# Patient Record
Sex: FEMALE | Race: WHITE | Employment: OTHER | URBAN - METROPOLITAN AREA
[De-identification: names, ages, dates, MRNs, and addresses within clinical notes are randomized per-mention and may not be internally consistent; named-entity substitution may affect disease eponyms.]

---

## 2017-02-25 RX ORDER — EZETIMIBE 10 MG/1
10 TABLET ORAL DAILY
Qty: 90 TABLET | Refills: 3 | Status: SHIPPED | OUTPATIENT
Start: 2017-02-25 | End: 2017-05-01 | Stop reason: SDUPTHER

## 2017-05-01 ENCOUNTER — OFFICE VISIT (OUTPATIENT)
Dept: FAMILY MEDICINE CLINIC | Age: 77
End: 2017-05-01

## 2017-05-01 VITALS
DIASTOLIC BLOOD PRESSURE: 78 MMHG | SYSTOLIC BLOOD PRESSURE: 134 MMHG | BODY MASS INDEX: 30.21 KG/M2 | WEIGHT: 176 LBS | HEART RATE: 71 BPM | OXYGEN SATURATION: 98 %

## 2017-05-01 DIAGNOSIS — M79.2 PERIPHERAL NEUROPATHIC PAIN: ICD-10-CM

## 2017-05-01 DIAGNOSIS — M77.8 CAPSULITIS OF RIGHT SHOULDER: ICD-10-CM

## 2017-05-01 DIAGNOSIS — I83.893 VARICOSE VEINS OF LOWER EXTREMITIES WITH OTHER COMPLICATIONS: ICD-10-CM

## 2017-05-01 DIAGNOSIS — I10 ESSENTIAL HYPERTENSION: Primary | ICD-10-CM

## 2017-05-01 PROCEDURE — G8399 PT W/DXA RESULTS DOCUMENT: HCPCS | Performed by: FAMILY MEDICINE

## 2017-05-01 PROCEDURE — 1036F TOBACCO NON-USER: CPT | Performed by: FAMILY MEDICINE

## 2017-05-01 PROCEDURE — 4040F PNEUMOC VAC/ADMIN/RCVD: CPT | Performed by: FAMILY MEDICINE

## 2017-05-01 PROCEDURE — 1090F PRES/ABSN URINE INCON ASSESS: CPT | Performed by: FAMILY MEDICINE

## 2017-05-01 PROCEDURE — 1123F ACP DISCUSS/DSCN MKR DOCD: CPT | Performed by: FAMILY MEDICINE

## 2017-05-01 PROCEDURE — 99214 OFFICE O/P EST MOD 30 MIN: CPT | Performed by: FAMILY MEDICINE

## 2017-05-01 PROCEDURE — 3017F COLORECTAL CA SCREEN DOC REV: CPT | Performed by: FAMILY MEDICINE

## 2017-05-01 PROCEDURE — G8427 DOCREV CUR MEDS BY ELIG CLIN: HCPCS | Performed by: FAMILY MEDICINE

## 2017-05-01 PROCEDURE — G8419 CALC BMI OUT NRM PARAM NOF/U: HCPCS | Performed by: FAMILY MEDICINE

## 2017-05-01 RX ORDER — GABAPENTIN 100 MG/1
CAPSULE ORAL
Qty: 90 CAPSULE | Refills: 3 | Status: SHIPPED | OUTPATIENT
Start: 2017-05-01 | End: 2017-06-26 | Stop reason: SDUPTHER

## 2017-05-01 RX ORDER — TRIAMTERENE AND HYDROCHLOROTHIAZIDE 37.5; 25 MG/1; MG/1
1 TABLET ORAL DAILY
Qty: 90 TABLET | Refills: 3 | Status: SHIPPED | OUTPATIENT
Start: 2017-05-01 | End: 2018-06-24 | Stop reason: SDUPTHER

## 2017-05-01 RX ORDER — PRAMIPEXOLE DIHYDROCHLORIDE 0.5 MG/1
TABLET ORAL
Qty: 180 TABLET | Refills: 3 | Status: SHIPPED | OUTPATIENT
Start: 2017-05-01 | End: 2018-07-05 | Stop reason: SDUPTHER

## 2017-05-01 RX ORDER — EPINEPHRINE 0.3 MG/.3ML
INJECTION SUBCUTANEOUS
Qty: 1 EACH | Refills: 0 | Status: SHIPPED | OUTPATIENT
Start: 2017-05-01 | End: 2018-11-14 | Stop reason: SDUPTHER

## 2017-05-01 RX ORDER — EZETIMIBE 10 MG/1
10 TABLET ORAL DAILY
Qty: 90 TABLET | Refills: 3 | Status: SHIPPED | OUTPATIENT
Start: 2017-05-01 | End: 2018-06-24 | Stop reason: SDUPTHER

## 2017-05-01 ASSESSMENT — ENCOUNTER SYMPTOMS
SHORTNESS OF BREATH: 0
DIARRHEA: 0
BACK PAIN: 0
CONSTIPATION: 0
ABDOMINAL PAIN: 0

## 2017-05-02 LAB
ALBUMIN SERPL-MCNC: 4.1 G/DL (ref 3.5–5)
ALP BLD-CCNC: 78 IU/L (ref 35–104)
ALT SERPL-CCNC: 17 IU/L (ref 10–35)
AST SERPL-CCNC: 19 IU/L (ref 10–35)
BASOPHILS ABSOLUTE: 0 THOU/MCL (ref 0.01–0.07)
BASOPHILS ABSOLUTE: 1 %
BILIRUB SERPL-MCNC: 0.4 MG/DL (ref 0–1)
BUN BLDV-MCNC: 17 MG/DL (ref 8–26)
CALCIUM SERPL-MCNC: 10.2 MG/DL (ref 8.8–10.1)
CHLORIDE BLD-SCNC: 100 MEQ/L (ref 101–111)
CHOLESTEROL, TOTAL: 186 MG/DL
CHOLESTEROL/HDL RATIO: 2.7 (ref 1.9–4.2)
CO2: 25 MEQ/L (ref 22–29)
CREAT SERPL-MCNC: 0.71 MG/DL (ref 0.44–1.03)
EOSINOPHILS ABSOLUTE: 0.1 THOU/MCL (ref 0.03–0.45)
EOSINOPHILS RELATIVE PERCENT: 2 %
ESTIMATED AVERAGE GLUCOSE: 123 MG/DL
FOLATE: >20 NG/ML
GFR AFRICAN AMERICAN: >60 ML/MIN/1.73 SQ METER
GFR NON-AFRICAN AMERICAN: >60 ML/MIN/1.73 SQ METER
GLUCOSE BLD-MCNC: 102 MG/DL (ref 70–99)
HBA1C MFR BLD: 5.9 % (ref 4–6)
HCT VFR BLD CALC: 35 % (ref 36–46)
HDLC SERPL-MCNC: 69 MG/DL
HEMOGLOBIN: 11.6 G/DL (ref 12–15.2)
LDL CHOLESTEROL CALCULATED: 98 MG/DL
LDL/HDL RATIO: 1.4 (ref 1–4)
LYMPHOCYTES ABSOLUTE: 1 THOU/MCL (ref 1–4)
LYMPHOCYTES RELATIVE PERCENT: 15 %
MCH RBC QN AUTO: 29 PG (ref 27–33)
MCHC RBC AUTO-ENTMCNC: 33 G/DL (ref 32–36)
MCV RBC AUTO: 86 FL (ref 82–97)
MONOCYTES # BLD: 9 %
MONOCYTES ABSOLUTE: 0.6 THOU/MCL (ref 0.2–0.9)
NEUTROPHILS ABSOLUTE: 5 THOU/MCL (ref 1.8–7.7)
PDW BLD-RTO: 13.1 %
PLATELET # BLD: 247 THOU/MCL (ref 140–375)
POTASSIUM SERPL-SCNC: 3.9 MEQ/L (ref 3.6–5.1)
RBC # BLD: 4.05 MIL/MCL (ref 3.8–5.2)
SEG NEUTROPHILS: 73 %
SODIUM BLD-SCNC: 137 MEQ/L (ref 135–145)
T4 FREE: 1.3 NG/DL (ref 0.93–1.7)
TOTAL PROTEIN: 7 G/DL (ref 6.6–8.7)
TRIGL SERPL-MCNC: 94 MG/DL
TSH ULTRASENSITIVE: 2.58 MIU/L (ref 0.27–4.2)
VITAMIN B-12: 782 PG/ML (ref 211–946)
WBC # BLD: 6.8 THOU/MCL (ref 3.6–10.5)

## 2017-05-03 LAB — TREPONEMA PALLIDUM ANTIBODIES: 0.03 INDEX

## 2017-05-10 ENCOUNTER — TELEPHONE (OUTPATIENT)
Dept: FAMILY MEDICINE CLINIC | Age: 77
End: 2017-05-10

## 2017-06-26 ENCOUNTER — OFFICE VISIT (OUTPATIENT)
Dept: FAMILY MEDICINE CLINIC | Age: 77
End: 2017-06-26

## 2017-06-26 VITALS
WEIGHT: 172 LBS | DIASTOLIC BLOOD PRESSURE: 74 MMHG | SYSTOLIC BLOOD PRESSURE: 124 MMHG | BODY MASS INDEX: 29.52 KG/M2 | HEART RATE: 85 BPM | OXYGEN SATURATION: 97 %

## 2017-06-26 DIAGNOSIS — M79.2 PERIPHERAL NEUROPATHIC PAIN: Primary | ICD-10-CM

## 2017-06-26 PROCEDURE — 1036F TOBACCO NON-USER: CPT | Performed by: FAMILY MEDICINE

## 2017-06-26 PROCEDURE — 1090F PRES/ABSN URINE INCON ASSESS: CPT | Performed by: FAMILY MEDICINE

## 2017-06-26 PROCEDURE — G8427 DOCREV CUR MEDS BY ELIG CLIN: HCPCS | Performed by: FAMILY MEDICINE

## 2017-06-26 PROCEDURE — 1123F ACP DISCUSS/DSCN MKR DOCD: CPT | Performed by: FAMILY MEDICINE

## 2017-06-26 PROCEDURE — 4040F PNEUMOC VAC/ADMIN/RCVD: CPT | Performed by: FAMILY MEDICINE

## 2017-06-26 PROCEDURE — G8399 PT W/DXA RESULTS DOCUMENT: HCPCS | Performed by: FAMILY MEDICINE

## 2017-06-26 PROCEDURE — G8419 CALC BMI OUT NRM PARAM NOF/U: HCPCS | Performed by: FAMILY MEDICINE

## 2017-06-26 PROCEDURE — 99213 OFFICE O/P EST LOW 20 MIN: CPT | Performed by: FAMILY MEDICINE

## 2017-06-26 RX ORDER — GABAPENTIN 300 MG/1
300 CAPSULE ORAL 3 TIMES DAILY
Qty: 90 CAPSULE | Refills: 3 | Status: SHIPPED | OUTPATIENT
Start: 2017-06-26 | End: 2017-09-26 | Stop reason: SDUPTHER

## 2017-06-29 ENCOUNTER — PROCEDURE VISIT (OUTPATIENT)
Dept: NEUROLOGY | Age: 77
End: 2017-06-29

## 2017-06-29 DIAGNOSIS — G60.9 IDIOPATHIC PERIPHERAL NEUROPATHY: Primary | ICD-10-CM

## 2017-06-29 PROCEDURE — 95910 NRV CNDJ TEST 7-8 STUDIES: CPT | Performed by: PSYCHIATRY & NEUROLOGY

## 2017-06-29 PROCEDURE — 95886 MUSC TEST DONE W/N TEST COMP: CPT | Performed by: PSYCHIATRY & NEUROLOGY

## 2017-07-13 ENCOUNTER — OFFICE VISIT (OUTPATIENT)
Dept: FAMILY MEDICINE CLINIC | Age: 77
End: 2017-07-13

## 2017-07-13 VITALS
WEIGHT: 172.6 LBS | DIASTOLIC BLOOD PRESSURE: 74 MMHG | BODY MASS INDEX: 29.63 KG/M2 | HEART RATE: 75 BPM | SYSTOLIC BLOOD PRESSURE: 126 MMHG | TEMPERATURE: 98 F | OXYGEN SATURATION: 98 %

## 2017-07-13 DIAGNOSIS — J40 BRONCHITIS: Primary | ICD-10-CM

## 2017-07-13 PROCEDURE — G8399 PT W/DXA RESULTS DOCUMENT: HCPCS | Performed by: PHYSICIAN ASSISTANT

## 2017-07-13 PROCEDURE — 1090F PRES/ABSN URINE INCON ASSESS: CPT | Performed by: PHYSICIAN ASSISTANT

## 2017-07-13 PROCEDURE — 1123F ACP DISCUSS/DSCN MKR DOCD: CPT | Performed by: PHYSICIAN ASSISTANT

## 2017-07-13 PROCEDURE — G8427 DOCREV CUR MEDS BY ELIG CLIN: HCPCS | Performed by: PHYSICIAN ASSISTANT

## 2017-07-13 PROCEDURE — 4040F PNEUMOC VAC/ADMIN/RCVD: CPT | Performed by: PHYSICIAN ASSISTANT

## 2017-07-13 PROCEDURE — 99213 OFFICE O/P EST LOW 20 MIN: CPT | Performed by: PHYSICIAN ASSISTANT

## 2017-07-13 PROCEDURE — G8419 CALC BMI OUT NRM PARAM NOF/U: HCPCS | Performed by: PHYSICIAN ASSISTANT

## 2017-07-13 PROCEDURE — 1036F TOBACCO NON-USER: CPT | Performed by: PHYSICIAN ASSISTANT

## 2017-07-13 RX ORDER — GUAIFENESIN AND CODEINE PHOSPHATE 100; 10 MG/5ML; MG/5ML
5 SOLUTION ORAL EVERY 4 HOURS PRN
Qty: 240 ML | Refills: 0 | Status: SHIPPED | OUTPATIENT
Start: 2017-07-13 | End: 2017-07-20

## 2017-07-13 RX ORDER — LEVOFLOXACIN 500 MG/1
500 TABLET, FILM COATED ORAL DAILY
Qty: 10 TABLET | Refills: 0 | Status: SHIPPED | OUTPATIENT
Start: 2017-07-13 | End: 2017-07-23

## 2017-07-13 ASSESSMENT — ENCOUNTER SYMPTOMS
COUGH: 1
NAUSEA: 0
DIARRHEA: 0
SHORTNESS OF BREATH: 0
VOMITING: 0
SINUS PRESSURE: 0
SORE THROAT: 1
WHEEZING: 0

## 2017-09-26 ENCOUNTER — OFFICE VISIT (OUTPATIENT)
Dept: FAMILY MEDICINE CLINIC | Age: 77
End: 2017-09-26

## 2017-09-26 VITALS
HEART RATE: 83 BPM | OXYGEN SATURATION: 97 % | HEIGHT: 64 IN | WEIGHT: 174 LBS | DIASTOLIC BLOOD PRESSURE: 68 MMHG | BODY MASS INDEX: 29.71 KG/M2 | SYSTOLIC BLOOD PRESSURE: 124 MMHG

## 2017-09-26 DIAGNOSIS — R91.8 MULTIPLE LUNG NODULES ON CT: ICD-10-CM

## 2017-09-26 DIAGNOSIS — R05.9 COUGH: ICD-10-CM

## 2017-09-26 DIAGNOSIS — M79.2 PERIPHERAL NEUROPATHIC PAIN: Primary | ICD-10-CM

## 2017-09-26 DIAGNOSIS — I10 ESSENTIAL HYPERTENSION: ICD-10-CM

## 2017-09-26 DIAGNOSIS — E78.5 OTHER AND UNSPECIFIED HYPERLIPIDEMIA: ICD-10-CM

## 2017-09-26 PROCEDURE — 1123F ACP DISCUSS/DSCN MKR DOCD: CPT | Performed by: FAMILY MEDICINE

## 2017-09-26 PROCEDURE — 4040F PNEUMOC VAC/ADMIN/RCVD: CPT | Performed by: FAMILY MEDICINE

## 2017-09-26 PROCEDURE — 90662 IIV NO PRSV INCREASED AG IM: CPT | Performed by: FAMILY MEDICINE

## 2017-09-26 PROCEDURE — 1036F TOBACCO NON-USER: CPT | Performed by: FAMILY MEDICINE

## 2017-09-26 PROCEDURE — G0008 ADMIN INFLUENZA VIRUS VAC: HCPCS | Performed by: FAMILY MEDICINE

## 2017-09-26 PROCEDURE — 99214 OFFICE O/P EST MOD 30 MIN: CPT | Performed by: FAMILY MEDICINE

## 2017-09-26 PROCEDURE — G8427 DOCREV CUR MEDS BY ELIG CLIN: HCPCS | Performed by: FAMILY MEDICINE

## 2017-09-26 PROCEDURE — G8399 PT W/DXA RESULTS DOCUMENT: HCPCS | Performed by: FAMILY MEDICINE

## 2017-09-26 PROCEDURE — G8417 CALC BMI ABV UP PARAM F/U: HCPCS | Performed by: FAMILY MEDICINE

## 2017-09-26 PROCEDURE — 1090F PRES/ABSN URINE INCON ASSESS: CPT | Performed by: FAMILY MEDICINE

## 2017-09-26 RX ORDER — GABAPENTIN 300 MG/1
300 CAPSULE ORAL 3 TIMES DAILY
Qty: 270 CAPSULE | Refills: 3 | Status: SHIPPED | OUTPATIENT
Start: 2017-09-26 | End: 2018-11-14 | Stop reason: SDUPTHER

## 2017-09-26 ASSESSMENT — ENCOUNTER SYMPTOMS
COUGH: 1
ABDOMINAL PAIN: 0
SHORTNESS OF BREATH: 0
BACK PAIN: 0
DIARRHEA: 0
CONSTIPATION: 0

## 2017-10-11 ENCOUNTER — TELEPHONE (OUTPATIENT)
Dept: FAMILY MEDICINE CLINIC | Age: 77
End: 2017-10-11

## 2017-10-11 NOTE — TELEPHONE ENCOUNTER
Pt states regarding the status of the office calling to verify that her insurance will cover chest x-ray    Please call and advise

## 2017-10-19 ENCOUNTER — HOSPITAL ENCOUNTER (OUTPATIENT)
Dept: CT IMAGING | Age: 77
Discharge: HOME | End: 2017-10-19
Attending: FAMILY MEDICINE | Admitting: FAMILY MEDICINE

## 2017-10-19 DIAGNOSIS — R05.9 COUGH: ICD-10-CM

## 2017-10-19 DIAGNOSIS — R91.8 OTHER NONSPECIFIC ABNORMAL FINDING OF LUNG FIELD (CODE): ICD-10-CM

## 2017-10-19 DIAGNOSIS — R91.8 MULTIPLE LUNG NODULES ON CT: ICD-10-CM

## 2018-06-05 ENCOUNTER — TELEPHONE (OUTPATIENT)
Dept: FAMILY MEDICINE CLINIC | Age: 78
End: 2018-06-05

## 2018-06-08 DIAGNOSIS — H91.90 HEARING LOSS, UNSPECIFIED HEARING LOSS TYPE, UNSPECIFIED LATERALITY: Primary | ICD-10-CM

## 2018-06-25 RX ORDER — EZETIMIBE 10 MG/1
TABLET ORAL
Qty: 90 TABLET | Refills: 0 | Status: SHIPPED | OUTPATIENT
Start: 2018-06-25 | End: 2018-10-10 | Stop reason: SDUPTHER

## 2018-06-25 RX ORDER — TRIAMTERENE AND HYDROCHLOROTHIAZIDE 37.5; 25 MG/1; MG/1
1 TABLET ORAL DAILY
Qty: 90 TABLET | Refills: 0 | Status: SHIPPED | OUTPATIENT
Start: 2018-06-25 | End: 2018-10-10 | Stop reason: SDUPTHER

## 2018-07-05 RX ORDER — PRAMIPEXOLE DIHYDROCHLORIDE 0.5 MG/1
TABLET ORAL
Qty: 180 TABLET | Refills: 0 | Status: SHIPPED | OUTPATIENT
Start: 2018-07-05 | End: 2018-09-30 | Stop reason: SDUPTHER

## 2018-07-16 ENCOUNTER — OFFICE VISIT (OUTPATIENT)
Dept: FAMILY MEDICINE CLINIC | Age: 78
End: 2018-07-16

## 2018-07-16 VITALS
SYSTOLIC BLOOD PRESSURE: 120 MMHG | WEIGHT: 174 LBS | DIASTOLIC BLOOD PRESSURE: 65 MMHG | OXYGEN SATURATION: 98 % | HEART RATE: 72 BPM | BODY MASS INDEX: 30.34 KG/M2

## 2018-07-16 DIAGNOSIS — M54.2 NECK PAIN: Primary | ICD-10-CM

## 2018-07-16 DIAGNOSIS — I83.90: ICD-10-CM

## 2018-07-16 DIAGNOSIS — M79.2 PERIPHERAL NEUROPATHIC PAIN: ICD-10-CM

## 2018-07-16 DIAGNOSIS — E78.00 PURE HYPERCHOLESTEROLEMIA: ICD-10-CM

## 2018-07-16 DIAGNOSIS — I10 ESSENTIAL HYPERTENSION: ICD-10-CM

## 2018-07-16 PROCEDURE — G8427 DOCREV CUR MEDS BY ELIG CLIN: HCPCS | Performed by: FAMILY MEDICINE

## 2018-07-16 PROCEDURE — 4040F PNEUMOC VAC/ADMIN/RCVD: CPT | Performed by: FAMILY MEDICINE

## 2018-07-16 PROCEDURE — G8417 CALC BMI ABV UP PARAM F/U: HCPCS | Performed by: FAMILY MEDICINE

## 2018-07-16 PROCEDURE — 1123F ACP DISCUSS/DSCN MKR DOCD: CPT | Performed by: FAMILY MEDICINE

## 2018-07-16 PROCEDURE — 1101F PT FALLS ASSESS-DOCD LE1/YR: CPT | Performed by: FAMILY MEDICINE

## 2018-07-16 PROCEDURE — 1036F TOBACCO NON-USER: CPT | Performed by: FAMILY MEDICINE

## 2018-07-16 PROCEDURE — G8399 PT W/DXA RESULTS DOCUMENT: HCPCS | Performed by: FAMILY MEDICINE

## 2018-07-16 PROCEDURE — 99214 OFFICE O/P EST MOD 30 MIN: CPT | Performed by: FAMILY MEDICINE

## 2018-07-16 PROCEDURE — 1090F PRES/ABSN URINE INCON ASSESS: CPT | Performed by: FAMILY MEDICINE

## 2018-07-16 NOTE — PROGRESS NOTES
 Calcium Carbonate-Vitamin D (CALCIUM + D PO) Take 2 tablets by mouth. Take 2 tablets by mouth daily.  Multiple Vitamins-Minerals (CENTRUM SILVER ULTRA WOMENS) TABS Take  by mouth. Take  by mouth daily.  omeprazole (PRILOSEC) 20 MG capsule Take 20 mg by mouth daily. No current facility-administered medications on file prior to visit. Patient is nonsmoker. She has had bilateral mastectomies she follows with her oncologist at [facility] and has not had any blood work from them in some time. Review of Systems    Objective:   Physical Exam   Constitutional: She is oriented to person, place, and time. She appears well-developed and well-nourished. Eyes: Pupils are equal, round, and reactive to light. Neck:   Mild decrease in range of motion of the neck on rotation. Flexion and extension is normal. There are tight muscles noted in the upper back. Cardiovascular: Normal rate, regular rhythm, normal heart sounds and intact distal pulses. Pulmonary/Chest: Effort normal and breath sounds normal.   Musculoskeletal:   Varicose veins bilaterally in the lower extremities. There is an area of superficial in the right medial distal calf consistent with a firm superficial vein but no evidence of DVT   Lymphadenopathy:     She has no cervical adenopathy. She has no axillary adenopathy. Right: No supraclavicular adenopathy present. Left: No supraclavicular adenopathy present. Neurological: She is alert and oriented to person, place, and time. Assessment:       Diagnosis Orders   1. Neck pain     2. Varicose veins of lower limb     3. Peripheral neuropathic pain  CBC   4. Pure hypercholesterolemia  Lipid Panel    Comprehensive Metabolic Panel   5. Essential hypertension  CBC           Plan:      Patient was reassured about her neck pain which is likely due to mild osteoarthritis and some tightness of muscles of the neck. I recommend using heat massage Tylenol and Advil or Aleve.

## 2018-07-20 LAB
ALBUMIN SERPL-MCNC: 4.4 G/DL (ref 3.5–5)
ALP BLD-CCNC: 75 IU/L (ref 35–104)
ALT SERPL-CCNC: 17 IU/L (ref 10–35)
ANION GAP SERPL CALCULATED.3IONS-SCNC: 12 MMOL/L (ref 6–18)
AST SERPL-CCNC: 21 IU/L (ref 10–35)
BILIRUB SERPL-MCNC: 0.3 MG/DL (ref 0–1)
BUN BLDV-MCNC: 19 MG/DL (ref 8–26)
CALCIUM SERPL-MCNC: 9.6 MG/DL (ref 8.8–10.1)
CHLORIDE BLD-SCNC: 97 MEQ/L (ref 101–111)
CHOLESTEROL, TOTAL: 191 MG/DL
CO2: 30 MMOL/L (ref 22–29)
CREAT SERPL-MCNC: 0.94 MG/DL (ref 0.44–1.03)
GFR AFRICAN AMERICAN: 66 ML/MIN/1.73 M2
GFR NON-AFRICAN AMERICAN: 57 ML/MIN/1.73 M2
GLUCOSE BLD-MCNC: 93 MG/DL (ref 70–99)
HCT VFR BLD CALC: 35.9 % (ref 36–46)
HDLC SERPL-MCNC: 75 MG/DL
HEMOGLOBIN: 12.2 G/DL (ref 12–15.2)
LDL CHOLESTEROL CALCULATED: 84 MG/DL
MCH RBC QN AUTO: 30.2 PG (ref 27–33)
MCHC RBC AUTO-ENTMCNC: 34 G/DL (ref 32–36)
MCV RBC AUTO: 88.9 FL (ref 82–97)
NON HDL CHOL. (LDL+VLDL): 116 MG/DL
PDW BLD-RTO: 13.3 %
PLATELET # BLD: 238 THOU/MCL (ref 140–375)
PMV BLD AUTO: 8 FL (ref 7.4–11.5)
POTASSIUM SERPL-SCNC: 4 MEQ/L (ref 3.6–5.1)
RBC # BLD: 4.04 MIL/MCL (ref 3.8–5.2)
SODIUM BLD-SCNC: 135 MEQ/L (ref 135–145)
TOTAL PROTEIN: 6.9 G/DL (ref 6.6–8.7)
TRIGL SERPL-MCNC: 160 MG/DL
WBC # BLD: 6.9 THOU/MCL (ref 3.6–10.5)

## 2018-10-01 RX ORDER — PRAMIPEXOLE DIHYDROCHLORIDE 0.5 MG/1
TABLET ORAL
Qty: 180 TABLET | Refills: 0 | Status: SHIPPED | OUTPATIENT
Start: 2018-10-01 | End: 2018-12-30 | Stop reason: SDUPTHER

## 2018-10-10 RX ORDER — TRIAMTERENE AND HYDROCHLOROTHIAZIDE 37.5; 25 MG/1; MG/1
1 TABLET ORAL DAILY
Qty: 90 TABLET | Refills: 0 | Status: SHIPPED | OUTPATIENT
Start: 2018-10-10 | End: 2019-01-16 | Stop reason: SDUPTHER

## 2018-10-10 RX ORDER — EZETIMIBE 10 MG/1
TABLET ORAL
Qty: 90 TABLET | Refills: 0 | Status: SHIPPED | OUTPATIENT
Start: 2018-10-10 | End: 2019-01-16 | Stop reason: SDUPTHER

## 2018-10-31 ENCOUNTER — HOSPITAL ENCOUNTER (OUTPATIENT)
Dept: NON INVASIVE DIAGNOSTICS | Age: 78
Discharge: HOME | End: 2018-10-31
Payer: MEDICARE

## 2018-10-31 LAB
LEFT VENTRICULAR EJECTION FRACTION HIGH VALUE: 60 %
LEFT VENTRICULAR EJECTION FRACTION MODE: NORMAL
LV EF: 55 %
LV EF: 58 %
LVEF MODALITY: NORMAL

## 2018-10-31 PROCEDURE — 93306 TTE W/DOPPLER COMPLETE: CPT

## 2018-11-14 ENCOUNTER — OFFICE VISIT (OUTPATIENT)
Dept: FAMILY MEDICINE CLINIC | Age: 78
End: 2018-11-14
Payer: MEDICARE

## 2018-11-14 VITALS
HEART RATE: 75 BPM | SYSTOLIC BLOOD PRESSURE: 138 MMHG | DIASTOLIC BLOOD PRESSURE: 72 MMHG | OXYGEN SATURATION: 98 % | WEIGHT: 175 LBS | BODY MASS INDEX: 30.51 KG/M2

## 2018-11-14 DIAGNOSIS — M79.2 PERIPHERAL NEUROPATHIC PAIN: ICD-10-CM

## 2018-11-14 DIAGNOSIS — T78.2XXA ANAPHYLACTIC SHOCK: ICD-10-CM

## 2018-11-14 DIAGNOSIS — I10 ESSENTIAL HYPERTENSION: Primary | ICD-10-CM

## 2018-11-14 DIAGNOSIS — E78.00 PURE HYPERCHOLESTEROLEMIA: ICD-10-CM

## 2018-11-14 PROCEDURE — 1101F PT FALLS ASSESS-DOCD LE1/YR: CPT | Performed by: FAMILY MEDICINE

## 2018-11-14 PROCEDURE — G8484 FLU IMMUNIZE NO ADMIN: HCPCS | Performed by: FAMILY MEDICINE

## 2018-11-14 PROCEDURE — 1090F PRES/ABSN URINE INCON ASSESS: CPT | Performed by: FAMILY MEDICINE

## 2018-11-14 PROCEDURE — 4040F PNEUMOC VAC/ADMIN/RCVD: CPT | Performed by: FAMILY MEDICINE

## 2018-11-14 PROCEDURE — G8417 CALC BMI ABV UP PARAM F/U: HCPCS | Performed by: FAMILY MEDICINE

## 2018-11-14 PROCEDURE — G8399 PT W/DXA RESULTS DOCUMENT: HCPCS | Performed by: FAMILY MEDICINE

## 2018-11-14 PROCEDURE — G8427 DOCREV CUR MEDS BY ELIG CLIN: HCPCS | Performed by: FAMILY MEDICINE

## 2018-11-14 PROCEDURE — 1036F TOBACCO NON-USER: CPT | Performed by: FAMILY MEDICINE

## 2018-11-14 PROCEDURE — 99214 OFFICE O/P EST MOD 30 MIN: CPT | Performed by: FAMILY MEDICINE

## 2018-11-14 PROCEDURE — 1123F ACP DISCUSS/DSCN MKR DOCD: CPT | Performed by: FAMILY MEDICINE

## 2018-11-14 RX ORDER — GABAPENTIN 300 MG/1
300 CAPSULE ORAL 3 TIMES DAILY
Qty: 270 CAPSULE | Refills: 3 | Status: SHIPPED | OUTPATIENT
Start: 2018-11-14 | End: 2019-08-16

## 2018-11-14 RX ORDER — EPINEPHRINE 0.3 MG/.3ML
INJECTION SUBCUTANEOUS
Qty: 1 EACH | Refills: 0 | Status: SHIPPED | OUTPATIENT
Start: 2018-11-14 | End: 2019-08-16

## 2018-11-14 ASSESSMENT — PATIENT HEALTH QUESTIONNAIRE - PHQ9
2. FEELING DOWN, DEPRESSED OR HOPELESS: 0
SUM OF ALL RESPONSES TO PHQ9 QUESTIONS 1 & 2: 0
SUM OF ALL RESPONSES TO PHQ QUESTIONS 1-9: 0
SUM OF ALL RESPONSES TO PHQ QUESTIONS 1-9: 0
1. LITTLE INTEREST OR PLEASURE IN DOING THINGS: 0

## 2018-11-14 NOTE — PROGRESS NOTES
Chief Complaint   Patient presents with    Hypertension    Hyperlipidemia    Peripheral Neuropathy        Electronically signed by Shaun Manning on 11/14/2018 at 10:33 AM       Patient is here for follow-up of the following problems:  Patient is here for follow-up of her multiple problems of hypertension hyperlipidemia and peripheral neuropathy. In addition patient has lymphedema of her left arm. She does exercises routinely. She also wears a compression stocking on her left arm. Patient has burning in her feet which has helped with the gabapentin. She would like to go back on it because of her discomfort. She also has significant varicose vein disease in her calves and her feet. She states she feels swelling in her feet. She wears compression stockings most days on her legs. She is a nonsmoker, former teacher  Intolerant to statins  [employer],  Patient had a very severe reaction 3 years ago that appeared as an anaphylactic reaction. The etiology of this was never determined. She needs a refill on her EpiPen that she carries in case it ever recurs, she had evaluation by an allergist with negative findings      Treatment Adherence:   Medication compliance:  compliant all of the time  Diet compliance:  compliant most of the time  Weight trend: stable  Current exercise: Does some walking  Barriers: none    Hypertension:  Home blood pressure monitoring: No. Patient denies chest pain and shortness of breath. Antihypertensive medication side effects: no medication side effects noted. Use of agents associated with hypertension: none. Sodium (mEq/L)   Date Value   07/20/2018 135    BUN (mg/dL)   Date Value   07/20/2018 19    Glucose (mg/dL)   Date Value   07/20/2018 93      Potassium (mEq/L)   Date Value   07/20/2018 4.0    CREATININE (mg/dL)   Date Value   07/20/2018 0.94         Hyperlipidemia:  No new myalgias or GI upset on eztemibe (Zetia).      Lab Results

## 2018-11-20 ENCOUNTER — OFFICE VISIT (OUTPATIENT)
Dept: VASCULAR SURGERY | Age: 78
End: 2018-11-20
Payer: MEDICARE

## 2018-11-20 VITALS
HEIGHT: 64 IN | SYSTOLIC BLOOD PRESSURE: 126 MMHG | BODY MASS INDEX: 29.71 KG/M2 | WEIGHT: 174 LBS | DIASTOLIC BLOOD PRESSURE: 74 MMHG

## 2018-11-20 DIAGNOSIS — I87.2 VENOUS INSUFFICIENCY (CHRONIC) (PERIPHERAL): Primary | ICD-10-CM

## 2018-11-20 PROCEDURE — G8484 FLU IMMUNIZE NO ADMIN: HCPCS | Performed by: SURGERY

## 2018-11-20 PROCEDURE — 1101F PT FALLS ASSESS-DOCD LE1/YR: CPT | Performed by: SURGERY

## 2018-11-20 PROCEDURE — 1123F ACP DISCUSS/DSCN MKR DOCD: CPT | Performed by: SURGERY

## 2018-11-20 PROCEDURE — G8427 DOCREV CUR MEDS BY ELIG CLIN: HCPCS | Performed by: SURGERY

## 2018-11-20 PROCEDURE — G8399 PT W/DXA RESULTS DOCUMENT: HCPCS | Performed by: SURGERY

## 2018-11-20 PROCEDURE — 99203 OFFICE O/P NEW LOW 30 MIN: CPT | Performed by: SURGERY

## 2018-11-20 PROCEDURE — 4040F PNEUMOC VAC/ADMIN/RCVD: CPT | Performed by: SURGERY

## 2018-11-20 PROCEDURE — G8417 CALC BMI ABV UP PARAM F/U: HCPCS | Performed by: SURGERY

## 2018-11-20 PROCEDURE — 1090F PRES/ABSN URINE INCON ASSESS: CPT | Performed by: SURGERY

## 2018-11-20 PROCEDURE — 1036F TOBACCO NON-USER: CPT | Performed by: SURGERY

## 2018-12-31 RX ORDER — PRAMIPEXOLE DIHYDROCHLORIDE 0.5 MG/1
TABLET ORAL
Qty: 180 TABLET | Refills: 2 | Status: SHIPPED | OUTPATIENT
Start: 2018-12-31 | End: 2019-11-11

## 2019-01-16 RX ORDER — TRIAMTERENE AND HYDROCHLOROTHIAZIDE 37.5; 25 MG/1; MG/1
1 TABLET ORAL DAILY
Qty: 90 TABLET | Refills: 0 | Status: SHIPPED | OUTPATIENT
Start: 2019-01-16 | End: 2019-05-20 | Stop reason: SDUPTHER

## 2019-01-16 RX ORDER — EZETIMIBE 10 MG/1
TABLET ORAL
Qty: 90 TABLET | Refills: 0 | Status: SHIPPED | OUTPATIENT
Start: 2019-01-16 | End: 2019-05-20 | Stop reason: SDUPTHER

## 2019-05-04 ENCOUNTER — OFFICE VISIT (OUTPATIENT)
Dept: FAMILY MEDICINE CLINIC | Age: 79
End: 2019-05-04
Payer: MEDICARE

## 2019-05-04 VITALS
SYSTOLIC BLOOD PRESSURE: 140 MMHG | HEART RATE: 77 BPM | OXYGEN SATURATION: 98 % | WEIGHT: 172.8 LBS | BODY MASS INDEX: 29.66 KG/M2 | TEMPERATURE: 97.5 F | DIASTOLIC BLOOD PRESSURE: 80 MMHG

## 2019-05-04 DIAGNOSIS — K12.2 CELLULITIS OF ORAL SOFT TISSUES: Primary | ICD-10-CM

## 2019-05-04 PROCEDURE — G8427 DOCREV CUR MEDS BY ELIG CLIN: HCPCS | Performed by: NURSE PRACTITIONER

## 2019-05-04 PROCEDURE — 1036F TOBACCO NON-USER: CPT | Performed by: NURSE PRACTITIONER

## 2019-05-04 PROCEDURE — G8417 CALC BMI ABV UP PARAM F/U: HCPCS | Performed by: NURSE PRACTITIONER

## 2019-05-04 PROCEDURE — 99213 OFFICE O/P EST LOW 20 MIN: CPT | Performed by: NURSE PRACTITIONER

## 2019-05-04 PROCEDURE — 4040F PNEUMOC VAC/ADMIN/RCVD: CPT | Performed by: NURSE PRACTITIONER

## 2019-05-04 PROCEDURE — G8399 PT W/DXA RESULTS DOCUMENT: HCPCS | Performed by: NURSE PRACTITIONER

## 2019-05-04 PROCEDURE — 1090F PRES/ABSN URINE INCON ASSESS: CPT | Performed by: NURSE PRACTITIONER

## 2019-05-04 PROCEDURE — 1123F ACP DISCUSS/DSCN MKR DOCD: CPT | Performed by: NURSE PRACTITIONER

## 2019-05-04 RX ORDER — AMOXICILLIN 875 MG/1
875 TABLET, COATED ORAL 2 TIMES DAILY
Qty: 20 TABLET | Refills: 0 | Status: SHIPPED | OUTPATIENT
Start: 2019-05-04 | End: 2019-05-14

## 2019-05-04 ASSESSMENT — ENCOUNTER SYMPTOMS
CHEST TIGHTNESS: 0
SHORTNESS OF BREATH: 0
NAUSEA: 0
VOMITING: 0
DIARRHEA: 0

## 2019-05-04 NOTE — PATIENT INSTRUCTIONS
Patient Education        Abscessed Tooth: Care Instructions  Your Care Instructions    An abscessed tooth is a tooth that has a pocket of pus in the tissues around it. Pus forms when the body tries to fight an infection caused by bacteria. If the pus cannot drain, it forms an abscess. An abscessed tooth can cause red, swollen gums and throbbing pain, especially when you chew. You may have a bad taste in your mouth and a fever, and your jaw may swell. Damage to the tooth, untreated tooth decay, or gum disease can cause an abscessed tooth. An abscessed tooth needs to be treated by a dental professional right away. If it is not treated, the infection could spread to other parts of your body. Your dentist will give you antibiotics to stop the infection. He or she may make a hole in the tooth or cut open (selene) the abscess inside your mouth so that the infection can drain, which should relieve your pain. You may need to have a root canal treatment, which tries to save your tooth by taking out the infected pulp and replacing it with a healing medicine and/or a filling. If these treatments do not work, your tooth may have to be removed. Follow-up care is a key part of your treatment and safety. Be sure to make and go to all appointments, and call your doctor if you are having problems. It's also a good idea to know your test results and keep a list of the medicines you take. How can you care for yourself at home? · Reduce pain and swelling in your face and jaw by putting ice or a cold pack on the outside of your cheek for 10 to 20 minutes at a time. Put a thin cloth between the ice and your skin. · Take pain medicines exactly as directed. ? If the doctor gave you a prescription medicine for pain, take it as prescribed. ? If you are not taking a prescription pain medicine, ask your doctor if you can take an over-the-counter medicine. · Take your antibiotics as directed.  Do not stop taking them just because you feel better. You need to take the full course of antibiotics. To prevent tooth abscess  · Brush and floss every day, and have regular dental checkups. · Eat a healthy diet, and avoid sugary foods and drinks. · Do not smoke, use e-cigarettes with nicotine, or use spit tobacco. Tobacco and nicotine slow your ability to heal. Tobacco also increases your risk for gum disease and cancer of the mouth and throat. If you need help quitting, talk to your doctor about stop-smoking programs and medicines. These can increase your chances of quitting for good. When should you call for help? Call 911 anytime you think you may need emergency care. For example, call if:    · You have trouble breathing.    Call your doctor now or seek immediate medical care if:    · You have new or worse symptoms of infection, such as:  ? Increased pain, swelling, warmth, or redness. ? Red streaks leading from the area. ? Pus draining from the area. ? A fever.    Watch closely for changes in your health, and be sure to contact your doctor if:    · You do not get better as expected. Where can you learn more? Go to https://[employer]amigo. org and sign in to your [employer] account. Enter E372 in the [facility] box to learn more about \"Abscessed Tooth: Care Instructions. \"     If you do not have an account, please click on the \"Sign Up Now\" link. Current as of: March 27, 2018  Content Version: 11.9  © 3970-5374 [employer]. Care instructions adapted under license by [facility] ([facility]). If you have questions about a medical condition or this instruction, always ask your healthcare professional. [place] [address] any warranty or liability for your use of this information.

## 2019-05-16 ENCOUNTER — OFFICE VISIT (OUTPATIENT)
Dept: PRIMARY CARE CLINIC | Age: 79
End: 2019-05-16
Payer: MEDICARE

## 2019-05-16 VITALS
OXYGEN SATURATION: 99 % | WEIGHT: 173 LBS | SYSTOLIC BLOOD PRESSURE: 118 MMHG | HEART RATE: 80 BPM | BODY MASS INDEX: 29.7 KG/M2 | DIASTOLIC BLOOD PRESSURE: 78 MMHG

## 2019-05-16 DIAGNOSIS — I10 ESSENTIAL HYPERTENSION: ICD-10-CM

## 2019-05-16 DIAGNOSIS — E78.5 HYPERLIPIDEMIA, UNSPECIFIED HYPERLIPIDEMIA TYPE: ICD-10-CM

## 2019-05-16 DIAGNOSIS — E55.9 HYPOVITAMINOSIS D: ICD-10-CM

## 2019-05-16 DIAGNOSIS — M75.111 INCOMPLETE TEAR OF RIGHT ROTATOR CUFF: ICD-10-CM

## 2019-05-16 DIAGNOSIS — M81.0 AGE-RELATED OSTEOPOROSIS WITHOUT CURRENT PATHOLOGICAL FRACTURE: ICD-10-CM

## 2019-05-16 DIAGNOSIS — R73.9 HYPERGLYCEMIA: Primary | ICD-10-CM

## 2019-05-16 PROCEDURE — 1090F PRES/ABSN URINE INCON ASSESS: CPT | Performed by: INTERNAL MEDICINE

## 2019-05-16 PROCEDURE — 1036F TOBACCO NON-USER: CPT | Performed by: INTERNAL MEDICINE

## 2019-05-16 PROCEDURE — G8427 DOCREV CUR MEDS BY ELIG CLIN: HCPCS | Performed by: INTERNAL MEDICINE

## 2019-05-16 PROCEDURE — G8399 PT W/DXA RESULTS DOCUMENT: HCPCS | Performed by: INTERNAL MEDICINE

## 2019-05-16 PROCEDURE — 4040F PNEUMOC VAC/ADMIN/RCVD: CPT | Performed by: INTERNAL MEDICINE

## 2019-05-16 PROCEDURE — G8417 CALC BMI ABV UP PARAM F/U: HCPCS | Performed by: INTERNAL MEDICINE

## 2019-05-16 PROCEDURE — 1123F ACP DISCUSS/DSCN MKR DOCD: CPT | Performed by: INTERNAL MEDICINE

## 2019-05-16 PROCEDURE — 99214 OFFICE O/P EST MOD 30 MIN: CPT | Performed by: INTERNAL MEDICINE

## 2019-05-16 RX ORDER — GABAPENTIN 100 MG/1
CAPSULE ORAL
Qty: 30 CAPSULE | Refills: 5 | Status: SHIPPED | OUTPATIENT
Start: 2019-05-16 | End: 2019-08-16

## 2019-05-16 ASSESSMENT — PATIENT HEALTH QUESTIONNAIRE - PHQ9
2. FEELING DOWN, DEPRESSED OR HOPELESS: 0
SUM OF ALL RESPONSES TO PHQ QUESTIONS 1-9: 0
SUM OF ALL RESPONSES TO PHQ9 QUESTIONS 1 & 2: 0
1. LITTLE INTEREST OR PLEASURE IN DOING THINGS: 0
SUM OF ALL RESPONSES TO PHQ QUESTIONS 1-9: 0

## 2019-05-16 NOTE — PROGRESS NOTES
2019    Maurice Bermudez (:  1940) is a 67 y.o. female, here for evaluation of the following medical concerns:    HPI Patient presents today for transfer of care, former Dr. Angel Freedman patient. Pt reports she is doing well overall w/o complaints. She reports she is tolerating her medications well, but she isn't sure why she is on gabapentin and she does not think it is helping her at all. She says she would like to minimize her medications. She reports she stays active, but could do better. She denies F/C, N/V, CP, SOB, abd pain. She reports a FHx osteoporosis. She reports her right shoulder has been bothering her for years, and she has had XR and injections with Ortho in the past. She last had these before going to winter in [place]. She is not sure if she is ready for surgery. Review of Systems   Constitutional: Negative for activity change, appetite change, chills, fatigue and fever. HENT: Negative for congestion and sinus pressure. Respiratory: Negative for cough, chest tightness and shortness of breath. Cardiovascular: Negative for chest pain and palpitations. Gastrointestinal: Negative for abdominal pain, constipation, diarrhea, nausea and vomiting. Genitourinary: Negative for difficulty urinating. Musculoskeletal: Negative for arthralgias. Skin: Negative for rash. Neurological: Negative for headaches. Current Outpatient Medications on File Prior to Visit   Medication Sig Dispense Refill    triamterene-hydrochlorothiazide (MAXZIDE-25) 37.5-25 MG per tablet TAKE 1 TABLET BY MOUTH DAILY 90 tablet 0    ezetimibe (ZETIA) 10 MG tablet TAKE 1 TABLET BY MOUTH EVERY DAY 90 tablet 0    pramipexole (MIRAPEX) 0.5 MG tablet TAKE 2 TABLETS BY MOUTH EVERY NIGHT 180 tablet 2    EPINEPHrine (EPIPEN 2-JOSE ALBERTO) 0.3 MG/0.3ML SOAJ injection Use as directed for allergic reaction 1 each 0    gabapentin (NEURONTIN) 300 MG capsule Take 1 capsule by mouth 3 times daily. . 270 capsule 3    Calcium Carbonate-Vitamin D (CALCIUM + D PO) Take 2 tablets by mouth. Take 2 tablets by mouth daily.  Multiple Vitamins-Minerals (CENTRUM SILVER ULTRA WOMENS) TABS Take  by mouth. Take  by mouth daily.  omeprazole (PRILOSEC) 20 MG capsule Take 20 mg by mouth daily. No current facility-administered medications on file prior to visit. Allergies   Allergen Reactions    Polidocanol Anaphylaxis    Lescol Other (See Comments)     Myalgias.  Lipitor Other (See Comments)     Muscle pain.         Past Medical History:   Diagnosis Date    Anaphylactic reaction 10/22/2015    Went to ER    Carcinoma in situ of breast     Esophageal reflux     hysterectomy     Lymphedema of left upper extremity 02/2016    Osteoarthrosis, unspecified whether generalized or localized, unspecified site     Other and unspecified hyperlipidemia     Other extrapyramidal disease and abnormal movement disorder     Unspecified essential hypertension     Varicose veins of lower extremities with other complications        Past Surgical History:   Procedure Laterality Date    COLONOSCOPY  1/8/02    colonoscopy screening (1/8/2002): mattie Mcginnis 10yr     HYSTERECTOMY      KNEE ARTHROSCOPY Right     rt knee, menisectomy     LUNG BIOPSY  12/21/2015    Cancer    MAMMO IMPLANT DIGITAL DIAG BI  11/2011    mammogram screening 11/2011    MASTECTOMY Bilateral 2016       Social History     Socioeconomic History    Marital status:      Spouse name: Not on file    Number of children: Not on file    Years of education: Not on file    Highest education level: Not on file   Occupational History    Occupation: retired    Social Needs    Financial resource strain: Not on file    Food insecurity:     Worry: Not on file     Inability: Not on file   [employer] needs:     Medical: Not on file     Non-medical: Not on file   Tobacco Use    Smoking status: Former Smoker     Last attempt to quit: 1/1/1995     Years since quittin.3    Smokeless tobacco: Never Used   Substance and Sexual Activity    Alcohol use: No     Alcohol/week: 0.0 oz    Drug use: No    Sexual activity: Not on file   Lifestyle    Physical activity:     Days per week: Not on file     Minutes per session: Not on file    Stress: Not on file   Relationships    Social connections:     Talks on phone: Not on file     Gets together: Not on file     Attends Buddhist service: Not on file     Active member of club or organization: Not on file     Attends meetings of clubs or organizations: Not on file     Relationship status: Not on file    Intimate partner violence:     Fear of current or ex partner: Not on file     Emotionally abused: Not on file     Physically abused: Not on file     Forced sexual activity: Not on file   Other Topics Concern    Not on file   Social History Narrative    Not on file        Family History   Problem Relation Age of Onset    Heart Disease Mother     Hypertension Mother     Heart Disease Father     Hypertension Father     Cancer Father     Diabetes Maternal Uncle        /78 (Site: Right Upper Arm, Position: Sitting, Cuff Size: Medium Adult)   Pulse 80   Wt 173 lb (78.5 kg)   SpO2 99%   BMI 29.70 kg/m²     Estimated body mass index is 29.7 kg/m² as calculated from the following:    Height as of 18: 5' 4\" (1.626 m). Weight as of this encounter: 173 lb (78.5 kg). Physical Exam   Constitutional: She is oriented to person, place, and time. She appears well-developed and well-nourished. No distress. HENT:   Head: Normocephalic and atraumatic. Right Ear: External ear normal.   Left Ear: External ear normal.   Mouth/Throat: Oropharynx is clear and moist.   Eyes: Pupils are equal, round, and reactive to light. Conjunctivae and EOM are normal.   Neck: Normal range of motion. Cardiovascular: Normal rate, regular rhythm, normal heart sounds and intact distal pulses.  Exam reveals no gallop and no friction rub.   No murmur heard. Pulmonary/Chest: Effort normal and breath sounds normal. No respiratory distress. She has no wheezes. She exhibits no tenderness. Abdominal: Soft. Bowel sounds are normal. She exhibits no distension. There is no tenderness. There is no rebound and no guarding. Musculoskeletal: She exhibits no edema. Lymphadenopathy:     She has no cervical adenopathy. Neurological: She is alert and oriented to person, place, and time. No cranial nerve deficit. Skin: Skin is warm and dry. No rash noted. She is not diaphoretic. Psychiatric: She has a normal mood and affect. Her behavior is normal.   Vitals reviewed. Right Shoulder Exam     Tenderness   The patient is experiencing tenderness in the acromioclavicular joint and biceps tendon. Range of Motion   Active abduction:  120 abnormal   Forward flexion: abnormal   Internal rotation 0 degrees: abnormal     Muscle Strength   The patient has normal right shoulder strength. Tests   Apprehension: positive  Corado test: positive  Cross arm: positive  Impingement: positive  Drop arm: negative    Other   Erythema: absent  Sensation: normal  Pulse: present              ASSESSMENT/PLAN:  Maikel Avila was seen today for new patient. Diagnoses and all orders for this visit:    Hyperglycemia  -     Hemoglobin A1C; Future    Hyperlipidemia, unspecified hyperlipidemia type  -     Lipid Panel; Future   - Check labs, cont zetia    Hypovitaminosis D  -     Vitamin D 25 Hydroxy; Future    Incomplete tear of right rotator cuff  -     MRI SHOULDER RIGHT WO CONTRAST; Future   - Suspect partial thickness articular side defect. Ortho vs PT based on results. May benefit from injection    Essential hypertension  -     Comprehensive Metabolic Panel; Future    Age-related osteoporosis without current pathological fracture  -     DEXA Bone Density Axial Skeleton; Future    Other orders  -     gabapentin (NEURONTIN) 100 MG capsule; Intended supply: 30 days.  Take 1 every morning with 300mg tab for total of 400mg  - Currently on 600 qAM + 300 qHS -- will begin weaning by switching to 400 qAM + 300 qHS, then continue from there      Current Outpatient Medications   Medication Sig Dispense Refill    gabapentin (NEURONTIN) 100 MG capsule Intended supply: 30 days. Take 1 every morning with 300mg tab for total of 400mg 30 capsule 5    triamterene-hydrochlorothiazide (MAXZIDE-25) 37.5-25 MG per tablet TAKE 1 TABLET BY MOUTH DAILY 90 tablet 0    ezetimibe (ZETIA) 10 MG tablet TAKE 1 TABLET BY MOUTH EVERY DAY 90 tablet 0    pramipexole (MIRAPEX) 0.5 MG tablet TAKE 2 TABLETS BY MOUTH EVERY NIGHT 180 tablet 2    EPINEPHrine (EPIPEN 2-JOSE ALBERTO) 0.3 MG/0.3ML SOAJ injection Use as directed for allergic reaction 1 each 0    gabapentin (NEURONTIN) 300 MG capsule Take 1 capsule by mouth 3 times daily. . 270 capsule 3    Calcium Carbonate-Vitamin D (CALCIUM + D PO) Take 2 tablets by mouth. Take 2 tablets by mouth daily.  Multiple Vitamins-Minerals (CENTRUM SILVER ULTRA WOMENS) TABS Take  by mouth. Take  by mouth daily.  omeprazole (PRILOSEC) 20 MG capsule Take 20 mg by mouth daily. No current facility-administered medications for this visit. Health Maintenance Due   Topic Date Due    DTaP/Tdap/Td vaccine (1 - Tdap) 05/18/1960    Shingles Vaccine (2 of 3) 10/04/2010     No follow-ups on file. An  electronic signature was used to authenticate this note.     -- Estrella Renae MD on 5/19/2019 at 5:15 AM

## 2019-05-19 ASSESSMENT — ENCOUNTER SYMPTOMS
SHORTNESS OF BREATH: 0
NAUSEA: 0
CHEST TIGHTNESS: 0
DIARRHEA: 0
VOMITING: 0
ABDOMINAL PAIN: 0
COUGH: 0
SINUS PRESSURE: 0
CONSTIPATION: 0

## 2019-05-20 RX ORDER — TRIAMTERENE AND HYDROCHLOROTHIAZIDE 37.5; 25 MG/1; MG/1
1 TABLET ORAL DAILY
Qty: 90 TABLET | Refills: 0 | Status: SHIPPED | OUTPATIENT
Start: 2019-05-20 | End: 2019-08-30

## 2019-05-20 RX ORDER — EZETIMIBE 10 MG/1
TABLET ORAL
Qty: 90 TABLET | Refills: 0 | Status: SHIPPED | OUTPATIENT
Start: 2019-05-20

## 2019-05-22 ENCOUNTER — TELEPHONE (OUTPATIENT)
Dept: FAMILY MEDICINE CLINIC | Age: 79
End: 2019-05-22

## 2019-05-22 NOTE — TELEPHONE ENCOUNTER
Please let pt know her labs looked good - liver and kidneys are working well, vitamin D is excellent, cholesterol is at goal. Her blood sugar was a little elevated in the pre-diabetes range - we just need to monitor this going forward. Her bone density exam showed osteopenia -- she is in a range where we could consider bisphosphonates to increase her bone density, but these are not absolutely indicated. Given her acid reflux, I would lean away from starting them at this time, but we should keep a close eye on her bone density in the future.  Thanks, Tom Randall

## 2019-05-23 ENCOUNTER — TELEPHONE (OUTPATIENT)
Dept: FAMILY MEDICINE CLINIC | Age: 79
End: 2019-05-23

## 2019-05-23 DIAGNOSIS — M19.011 ARTHRITIS OF RIGHT GLENOHUMERAL JOINT: Primary | ICD-10-CM

## 2019-06-17 ENCOUNTER — HOSPITAL ENCOUNTER (OUTPATIENT)
Dept: PHYSICAL THERAPY | Age: 79
Setting detail: THERAPIES SERIES
Discharge: HOME | End: 2019-06-17
Payer: MEDICARE

## 2019-06-17 PROCEDURE — 97110 THERAPEUTIC EXERCISES: CPT

## 2019-06-17 PROCEDURE — 97140 MANUAL THERAPY 1/> REGIONS: CPT

## 2019-06-17 PROCEDURE — 97161 PT EVAL LOW COMPLEX 20 MIN: CPT

## 2019-06-17 NOTE — PLAN OF CARE
Beth Rasheed  Phone: [phone]   Fax:     [phone]                                                       Physical Therapy Certification    Dear Referring Practitioner: Dr. Radha Glaser,    We had the pleasure of evaluating the following patient for physical therapy services at [address] [place] [address]. A summary of our findings can be found in the initial assessment below. This includes our plan of care. If you have any questions or concerns regarding these findings, please do not hesitate to contact me at the office phone number checked above. Thank you for the referral.       Physician Signature:_______________________________Date:__________________  By signing above (or electronic signature), therapists plan is approved by physician              Patient: Bobie Lesch   : 1940   MRN: [id number]  Referring Physician: Referring Practitioner: Dr. Radha Glaser      Evaluation Date: 2019      Medical Diagnosis Information:  Diagnosis: OA R shoulder    Treatment Diagnosis: R shoulder OA M19.011, R shoulder pain M25.511, incomplete tear R shoulder RTC M75.111                                         Insurance information: PT Insurance Information: Medicare/AARP    Precautions/ Contra-indications: none  Latex Allergy:  ?NO      ? YES  Preferred Language for Healthcare:   ?English       ? other:    SUBJECTIVE: Patient stated complaint: Patient reports that her R shoulder has been bothering her for about 4 years or so now. Has had 2 cortisone shots into shoulder in the past- last one was in October and didn't help much. Reports that she is unable to lift arm very much, has difficulty with crossing arm over body to fasten seat belt,curling hair and difficulty with pulling handle on lazy boy. Pain is present in shoulder while sleeping. Takes 2 tylenol and 1 advil on most days.  Has not played golf in about 2 years. Relevant Medical History:hx of TKR and B mastectomy  Functional Disability Index:  Quick Dash 68% disability    Pain Scale: 3/10-9/10  Easing factors: medication  Provocative factors: curling hair     Type: XXConstant   ? Intermittent  ? Radiating ? Localized ? other:     Numbness/Tingling: none    Occupation/School:retired     Living Status/Prior Level of Function: Independent with ADLs and IADLs, play golf    OBJECTIVE:     ROM Left Right   Shoulder Flex 150 90   Shoulder Abd 155 70   Shoulder ER 70 35   Shoulder IR- functional T6 R glute             Strength  Left Right   Shoulder Flex 4+/5 3+/5   Shoulder Scap 4+/5 4-/5   Shoulder ER 4+/5 3+/5   Shoulder IR 4+/5 3+/5           Reflexes Normal Abnormal Comments               S1-2 Seated achilles ? ? S1-2 Prone knee bend ? ? L3-4 Patellar tendon ? ? C5-6 Biceps ? ?    C6 Brachioradialis ? ? C7-8 Triceps ? ? Clonus ? ? Babinski ? ? Sow's ? ? Reflexes/Sensation:    ? Dermatomes/Myotomes intact    ? Reflexes equal and normal bilaterally   ? Other:    Joint mobility: limited   ? Normal    XXHypo   ? Hyper    Palpation: no pain with palpation    Functional Mobility/Transfers: limited in reaching OH, lifting, sleeping and self care    Posture: mildly lower R shoulder than L    Bandages/Dressings/Incisions: NA    Gait: (include devices/WB status): WNL     Orthopedic Special Tests:                        ? Patient history, allergies, meds reviewed. Medical chart reviewed. See intake form. Review Of Systems (ROS):  ?Performed Review of systems (Integumentary, CardioPulmonary, Neurological) by intake and observation. Intake form has been scanned into medical record. Patient has been instructed to contact their primary care physician regarding ROS issues if not already being addressed at this time. Co-morbidities/Complexities (which will affect course of rehabilitation):   ? None           Arthritic conditions   ? Rheumatoid services 1-2x week for 6-8 weeks to improve overall functional use of R UE. Functional Impairments:     ? Noted spinal or UE joint hypomobility   ? Noted spinal or UE joint hypermobility   XXDecreased spinal/UE functional ROM   ? Abnormal reflexes/sensation/myotomal/dermatomal deficits   XXDecreased RC/scapular/core strength and neuromuscular control    XXDecreased UE functional strength   ? other:      Functional Activity Limitations (from functional questionnaire and intake)   ? Reduced ability to tolerate prolonged functional positions   ? Reduced ability or difficulty with changes of positions or transfers between positions   XXReduced ability to maintain good posture and demonstrate good body mechanics with sitting, bending, and lifting   ? Reduced ability or tolerance with driving and/or computer work   XXReduced ability to perform lifting, reaching, carrying tasks   XXReduced ability to reach behind back   XXReduced ability to sleep    Neil Mario ability to tolerate any impact through UE or spine   ? Reduced ability to  or hold objects   ? Reduced ability to throw or toss an object   ? other:    Participation Restrictions   XXReduced participation in self care activities   XXReduced participation in home management activities   ? Reduced participation in work activities   ? Reduced participation in social activities. ?Reduced participation in sport/recreational activities. Classification/Subgrouping:   ?signs/symptoms consistent with post-surgical status including decreased ROM, strength and function. ?signs/symptoms consistent with joint sprain/strain    ?signs/symptoms consistent with shoulder impingement (internal, external, primary or secondary)   ? signs/symptoms consistent with shoulder/elbow/wrist tendinopathy   XXSigns/symptoms consistent with Rotator cuff tear and shoulder osteoarthritis   ?sign/symptoms consistent with labral tear   ?signs/symptoms consistent with rib including: strength training, ROM, for scapula, core and Upper extremity, including postural re-education. ?  NMR activation and proprioception for UE, periscapular and RC muscles and Core, including postural re-education. ?  Manual therapy as indicated for shoulder, scapula, spine and associated soft tissue including: Dry Needling/IASTM, STM, PROM, Gr I-IV mobilizations, manipulation. ? Modalities as needed that may include: thermal agents, E-stim, Biofeedback, US, iontophoresis as indicated  ? Patient education on joint protection, postural re-education, activity modification, progression of HEP. HEP instruction: SA punch, SL ER, isometric 5 way and row with red band see scanned forms)    GOALS:  Patient stated goal: improve movement    Therapist goals for Patient:   Short Term Goals: To be achieved in: 2 weeks  1. Independent in HEP and progression per patient tolerance, in order to prevent re-injury. 2. Patient will have a decrease in pain to facilitate improvement in movement, function, and ADLs as indicated by Functional Deficits. Long Term Goals: To be achieved in: 6-8 weeks  1. Disability index score of 45% or less for the Quick DASH to assist with reaching prior level of function. 2. Patient will demonstrate increased AROM to 0-120 degrees elevation to allow for proper joint functioning as indicated by Functional Deficits. 3. Patient will demonstrate an increase in NM recruitment/activation and overall GH and scapular strength to within n5lbs HHD or WNL for proper functional mobility as indicated by patients Functional Deficits. 4. Patient will return to reaching/lifting light objects without increased symptoms or restriction.    5. Patient will report being able to take light impact into joint without increased symptoms or restriction. (patient specific functional goal)       Electronically signed by:  María Lancaster

## 2019-06-17 NOTE — FLOWSHEET NOTE
[facility] [address] [facility]  Phone: [phone] Fax: [phone]      Physical Therapy Daily Treatment Note  Date:  2019    Patient Name:  Henrietta Chowdary    :  1940  MRN: [id number]  Restrictions/Precautions:    Medical/Treatment Diagnosis Information:  Diagnosis: OA R shoulder   Treatment Diagnosis: R shoulder OA M19.011, R shoulder pain M25.511, incomplete tear R shoulder RTC P10.713  Insurance/Certification information:  PT Insurance Information: Medicare/AARP  Physician Information:  Referring Practitioner: Dr. Andrew Morris of care signed (Y/N):     Date of Patient follow up with Physician: 2019    G-Code (if applicable):      Date G-Code Applied:  2019   Quick Dash 68% disability    Progress Note: [x]  Yes  []  No  Next due by: Visit #10      Latex Allergy:  [x]NO      []YES  Preferred Language for Healthcare:   [x]English       []other:    Visit # Insurance Allowable   1 Medicare/AARP     Pain level:  6-10     SUBJECTIVE:  Patient reports that her R shoulder has been bothering her for about 4 years or so now. Has had 2 cortisone shots into shoulder in the past- last one was in October and didn't help much. Reports that she is unable to lift arm very much, has difficulty with crossing arm over body to fasten seat belt,curling hair and difficulty with pulling handle on lazy boy. Pain is present in shoulder while sleeping. Takes 2 tylenol and 1 advil on most days. Has not played golf in about 2 years.      OBJECTIVE: See eval  Observation:   Test measurements:      RESTRICTIONS/PRECAUTIONS: prior TKR and B masectomy    Exercises/Interventions:   Therapeutic Ex (78981): 20 min Sets/sec Reps CUES/Notes   UBE      Pendulum/Ball rolls      Cane AAROM flex/press      5 way Isomet 5sec 15 All directions   T- band Row/pinch      T- band lower pinch 1 15    T- band ER activation      Supine SA punch 1 15    SL ER/ driving/computer work  [] (10332) Reviewed/Progressed HEP activities related to improving balance, coordination, kinesthetic sense, posture, motor skill, proprioception of scapular, scapulothoracic and UE control with self care, reaching, carrying, lifting, house/yardwork, driving/computer work      Manual Treatments:  PROM / STM / Oscillations-Mobs:  G-I, II, III, IV (PA's, Inf., Post.)  [x] (40564) Provided manual therapy to mobilize soft tissue/joints of cervical/CT, scapular GHJ and UE for the purpose of modulating pain, promoting relaxation,  increasing ROM, reducing/eliminating soft tissue swelling/inflammation/restriction, improving soft tissue extensibility and allowing for proper ROM for normal function with self care, reaching, carrying, lifting, house/yardwork, driving/computer work    Modalities:      Charges:  Timed Code Treatment Minutes: 30 min   Total Treatment Minutes: 55 min     [x] EVAL (LOW) 83055 (typically 20 minutes face-to-face)  [] EVAL (MOD) 90603 (typically 30 minutes face-to-face)  [] EVAL (HIGH) 15924 (typically 45 minutes face-to-face)  [] RE-EVAL     [x] PA(65480) x  1   [] IONTO  [] NMR (43545) x      [] VASO  [x] Manual (67193) x  1    [] Other:  [] TA x       [] Mech Traction (76405)  [] ES(attended) (19346)      [] ES (un) (69664):     GOALS:  Patient stated goal: improve movement    Therapist goals for Patient:   Short Term Goals: To be achieved in: 2 weeks  1. Independent in HEP and progression per patient tolerance, in order to prevent re-injury. 2. Patient will have a decrease in pain to facilitate improvement in movement, function, and ADLs as indicated by Functional Deficits. Long Term Goals: To be achieved in: 6-8 weeks  1. Disability index score of 45% or less for the Quick DASH to assist with reaching prior level of function.    2. Patient will demonstrate increased AROM to 0-120 degrees elevation to allow for proper joint functioning as indicated by Functional Deficits. 3. Patient will demonstrate an increase in NM recruitment/activation and overall GH and scapular strength to within n5lbs HHD or WNL for proper functional mobility as indicated by patients Functional Deficits. 4. Patient will return to reaching/lifting light objects without increased symptoms or restriction. 5. Patient will report being able to take light impact into joint without increased symptoms or restriction. Progression Towards Functional goals:  [] Patient is progressing as expected towards functional goals listed. [] Progression is slowed due to complexities listed. [] Progression has been slowed due to co-morbidities.   [x] Plan just implemented, too soon to assess goals progression  [] Other:     ASSESSMENT:  See eval    Return to Play: (if applicable)   []  Stage 1: Intro to Strength   []  Stage 2: Dynamic Strength and Intro to Plyometrics   []  Stage 3: Advanced Plyometrics and Intro to Throwing   []  Stage 4: Sport specific Training/Return to Sport     []  Ready to Return to Play, [employer]   []  Not Ready for Return to Sports   Comments:      Treatment/Activity Tolerance:  [x] Patient tolerated treatment well [] Patient limited by fatique  [] Patient limited by pain  [] Patient limited by other medical complications  [] Other:     Prognosis: [x] Good [] Fair  [] Poor    Patient Requires Follow-up: [x] Yes  [] No    PLAN: See eval  [] Continue per plan of care [] Alter current plan (see comments)  [x] Plan of care initiated [] Hold pending MD visit [] Discharge    Electronically signed by: Freda Chen

## 2019-06-19 ENCOUNTER — HOSPITAL ENCOUNTER (OUTPATIENT)
Dept: PHYSICAL THERAPY | Age: 79
Setting detail: THERAPIES SERIES
Discharge: HOME | End: 2019-06-19
Payer: MEDICARE

## 2019-06-19 PROCEDURE — 97110 THERAPEUTIC EXERCISES: CPT

## 2019-06-19 PROCEDURE — 97140 MANUAL THERAPY 1/> REGIONS: CPT

## 2019-06-19 NOTE — FLOWSHEET NOTE
Intervention  ([id number]): 10 min      Shld /GH Mobs 1 10    Post Cap mobs      Thoracic/Rib manipualtion      CT MT/Mobs      PROM MT      Elbow mobs            NMR re-education (51281)      T-spine Ext      GH depress/compress      Scap/GH NMR      Body blade      Wall ball roll      Wall Ball bounce      Ball drops      Donna Scap Bio      Floor Snow angels-sliders            Therapeutic Activity (75315)      UE throwing porgression      Dynamic UE stability      Earthquake Bar      Bodyblade                Therapeutic Exercise and NMR EXR  [x] (86362) Provided verbal/tactile cueing for activities related to strengthening, flexibility, endurance, ROM  for improvements in scapular, scapulothoracic and UE control with self care, reaching, carrying, lifting, house/yardwork, driving/computer work. [x] (89900) Provided verbal/tactile cueing for activities related to improving balance, coordination, kinesthetic sense, posture, motor skill, proprioception  to assist with  scapular, scapulothoracic and UE control with self care, reaching, carrying, lifting, house/yardwork, driving/computer work. Therapeutic Activities:    [] (21245 or 51947) Provided verbal/tactile cueing for activities related to improving balance, coordination, kinesthetic sense, posture, motor skill, proprioception and motor activation to allow for proper function of scapular, scapulothoracic and UE control with self care, carrying, lifting, driving/computer work.      Home Exercise Program:    [x] (79618) Reviewed/Progressed HEP activities related to strengthening, flexibility, endurance, ROM of scapular, scapulothoracic and UE control with self care, reaching, carrying, lifting, house/yardwork, driving/computer work  [] (00977) Reviewed/Progressed HEP activities related to improving balance, coordination, kinesthetic sense, posture, motor skill, proprioception of scapular, scapulothoracic and UE control with self care, reaching, carrying, lifting, house/yardwork, driving/computer work      Manual Treatments:  PROM / STM / Oscillations-Mobs:  G-I, II, III, IV (PA's, Inf., Post.)  [x] (53819) Provided manual therapy to mobilize soft tissue/joints of cervical/CT, scapular GHJ and UE for the purpose of modulating pain, promoting relaxation,  increasing ROM, reducing/eliminating soft tissue swelling/inflammation/restriction, improving soft tissue extensibility and allowing for proper ROM for normal function with self care, reaching, carrying, lifting, house/yardwork, driving/computer work    Modalities:      Charges:  Timed Code Treatment Minutes: 45 min   Total Treatment Minutes: 50 min     [] EVAL (LOW) 73365 (typically 20 minutes face-to-face)  [] EVAL (MOD) 21693 (typically 30 minutes face-to-face)  [] EVAL (HIGH) 90910 (typically 45 minutes face-to-face)  [] RE-EVAL     [x] QY(86805) x  2   [] IONTO  [] NMR (79508) x      [] VASO  [x] Manual (95065) x  1    [] Other:  [] TA x       [] Mech Traction (03824)  [] ES(attended) (80436)      [] ES (un) (72106):     GOALS:  Patient stated goal: improve movement    Therapist goals for Patient:   Short Term Goals: To be achieved in: 2 weeks  1. Independent in HEP and progression per patient tolerance, in order to prevent re-injury. 2. Patient will have a decrease in pain to facilitate improvement in movement, function, and ADLs as indicated by Functional Deficits. Long Term Goals: To be achieved in: 6-8 weeks  1. Disability index score of 45% or less for the Quick DASH to assist with reaching prior level of function. 2. Patient will demonstrate increased AROM to 0-120 degrees elevation to allow for proper joint functioning as indicated by Functional Deficits. 3. Patient will demonstrate an increase in NM recruitment/activation and overall GH and scapular strength to within n5lbs HHD or WNL for proper functional mobility as indicated by patients Functional Deficits.    4. Patient will return to reaching/lifting light objects without increased symptoms or restriction. 5. Patient will report being able to take light impact into joint without increased symptoms or restriction. Progression Towards Functional goals:  [x] Patient is progressing as expected towards functional goals listed. [] Progression is slowed due to complexities listed. [] Progression has been slowed due to co-morbidities. [] Plan just implemented, too soon to assess goals progression  [] Other:     ASSESSMENT:  Patient with less crepitus noted throughout exercises today. Crepitus and popping most noted with greater than 90 degrees elevation. Improvement in scapular activation and SL ER ROM. Continue to progress strength/ROM.      Return to Play: (if applicable)   []  Stage 1: Intro to Strength   []  Stage 2: Dynamic Strength and Intro to Plyometrics   []  Stage 3: Advanced Plyometrics and Intro to Throwing   []  Stage 4: Sport specific Training/Return to Sport     []  Ready to Return to Play, [employer]   []  Not Ready for Return to Sports   Comments:      Treatment/Activity Tolerance:  [x] Patient tolerated treatment well [] Patient limited by fatique  [] Patient limited by pain  [] Patient limited by other medical complications  [] Other:     Prognosis: [x] Good [] Fair  [] Poor    Patient Requires Follow-up: [x] Yes  [] No    PLAN: Continue to progress strength and ROM per pt tolerance  [x] Continue per plan of care [] Alter current plan (see comments)  [] Plan of care initiated [] Hold pending MD visit [] Discharge    Electronically signed by: Nikole Freeman

## 2019-06-24 ENCOUNTER — HOSPITAL ENCOUNTER (OUTPATIENT)
Dept: PHYSICAL THERAPY | Age: 79
Setting detail: THERAPIES SERIES
Discharge: HOME | End: 2019-06-24
Payer: MEDICARE

## 2019-06-24 PROCEDURE — 97140 MANUAL THERAPY 1/> REGIONS: CPT

## 2019-06-24 PROCEDURE — 97110 THERAPEUTIC EXERCISES: CPT

## 2019-06-24 NOTE — FLOWSHEET NOTE
[facility] [address] [facility]  Phone: [phone] Fax: [phone]      Physical Therapy Daily Treatment Note  Date:  2019    Patient Name:  Melani Mckay    :    MRN: [id number]  Restrictions/Precautions:    Medical/Treatment Diagnosis Information:  Diagnosis: OA R shoulder   Treatment Diagnosis: R shoulder OA M19.011, R shoulder pain M25.511, incomplete tear R shoulder RTC V01.708  Insurance/Certification information:  PT Insurance Information: Medicare/AARP  Physician Information:  Referring Practitioner: Dr. Cassidy Uriarte of care signed (Y/N):     Date of Patient follow up with Physician: 2019    G-Code (if applicable):      Date G-Code Applied:  2019   Quick Dash 68% disability    Progress Note: [x]  Yes  []  No  Next due by: Visit #10      Latex Allergy:  [x]NO      []YES  Preferred Language for Healthcare:   [x]English       []other:    Visit # Insurance Allowable   3 Medicare/AARP     Pain level:  5-6/10     SUBJECTIVE:  Patient reports that she has been doing her HEP. States that she did a lot of window washing and hanging pictures yesterday so shoulder is really sore today. Felt good after last session.      OBJECTIVE: increased crepitus of GH joint > 90 degrees elevation  Observation:   Test measurements:      RESTRICTIONS/PRECAUTIONS: prior TKR and B masectomy    Exercises/Interventions:   Therapeutic Ex (21994): 35 min Sets/sec Reps CUES/Notes   UBE      Pendulum/Ball rolls      Cane AAROM flex/press 1 20 3#; to 90*, press (with popping)   5 way Isomet 5sec 15 All directions   T- band Row/pinch 1 15 green   T- band lower pinch 1 15 green   T- band ER activation 1 10 red   Supine SA punch 1 20 2#   SL ER/SL punch 1 20 1#/0#   Prone Rows/ext 1 20 Row only   Prone HAB/Prone Flex      Wall Slides 1 15    Seated HH Depression      No Money      Scap Wall Lat touches/wall walks            Standing flex/scap      Standing Punch      Lawnmower 1 15 2#                           Manual Intervention  (81763): 10 min      Shld /GH Mobs 1 10 Grade I-II mobs; PROM all directions   Post Cap mobs      Thoracic/Rib manipualtion      CT MT/Mobs      PROM MT      Elbow mobs            NMR re-education (36520)      T-spine Ext      GH depress/compress      Scap/GH NMR      Body blade      Wall ball roll      Wall Ball bounce      Ball drops      Donna Scap Bio      Floor Snow angels-sliders            Therapeutic Activity (76856)      UE throwing porgression      Dynamic UE stability      Earthquake Bar      Bodyblade                Therapeutic Exercise and NMR EXR  [x] (93300) Provided verbal/tactile cueing for activities related to strengthening, flexibility, endurance, ROM  for improvements in scapular, scapulothoracic and UE control with self care, reaching, carrying, lifting, house/yardwork, driving/computer work. [x] (79282) Provided verbal/tactile cueing for activities related to improving balance, coordination, kinesthetic sense, posture, motor skill, proprioception  to assist with  scapular, scapulothoracic and UE control with self care, reaching, carrying, lifting, house/yardwork, driving/computer work. Therapeutic Activities:    [] (97832 or 11132) Provided verbal/tactile cueing for activities related to improving balance, coordination, kinesthetic sense, posture, motor skill, proprioception and motor activation to allow for proper function of scapular, scapulothoracic and UE control with self care, carrying, lifting, driving/computer work.      Home Exercise Program:    [x] (77948) Reviewed/Progressed HEP activities related to strengthening, flexibility, endurance, ROM of scapular, scapulothoracic and UE control with self care, reaching, carrying, lifting, house/yardwork, driving/computer work  [] (38673) Reviewed/Progressed HEP activities related to improving balance, coordination, kinesthetic sense, posture, motor skill, proprioception of scapular, scapulothoracic and UE control with self care, reaching, carrying, lifting, house/yardwork, driving/computer work      Manual Treatments:  PROM / STM / Oscillations-Mobs:  G-I, II, III, IV (PA's, Inf., Post.)  [x] (20058) Provided manual therapy to mobilize soft tissue/joints of cervical/CT, scapular GHJ and UE for the purpose of modulating pain, promoting relaxation,  increasing ROM, reducing/eliminating soft tissue swelling/inflammation/restriction, improving soft tissue extensibility and allowing for proper ROM for normal function with self care, reaching, carrying, lifting, house/yardwork, driving/computer work    Modalities:      Charges:  Timed Code Treatment Minutes: 45 min   Total Treatment Minutes: 50 min     [] EVAL (LOW) 58150 (typically 20 minutes face-to-face)  [] EVAL (MOD) 19176 (typically 30 minutes face-to-face)  [] EVAL (HIGH) 41115 (typically 45 minutes face-to-face)  [] RE-EVAL     [x] RP(58225) x  2   [] IONTO  [] NMR (01215) x      [] VASO  [x] Manual (63803) x  1    [] Other:  [] TA x       [] Mech Traction (72926)  [] ES(attended) (10466)      [] ES (un) (71056):     GOALS:  Patient stated goal: improve movement    Therapist goals for Patient:   Short Term Goals: To be achieved in: 2 weeks  1. Independent in HEP and progression per patient tolerance, in order to prevent re-injury. 2. Patient will have a decrease in pain to facilitate improvement in movement, function, and ADLs as indicated by Functional Deficits. Long Term Goals: To be achieved in: 6-8 weeks  1. Disability index score of 45% or less for the Quick DASH to assist with reaching prior level of function. 2. Patient will demonstrate increased AROM to 0-120 degrees elevation to allow for proper joint functioning as indicated by Functional Deficits.    3. Patient will demonstrate an increase in NM recruitment/activation and overall GH and scapular strength to within n5lbs HHD or

## 2019-06-27 ENCOUNTER — HOSPITAL ENCOUNTER (OUTPATIENT)
Dept: PHYSICAL THERAPY | Age: 79
Setting detail: THERAPIES SERIES
Discharge: HOME | End: 2019-06-27
Payer: MEDICARE

## 2019-06-27 PROCEDURE — 97140 MANUAL THERAPY 1/> REGIONS: CPT

## 2019-06-27 PROCEDURE — 97110 THERAPEUTIC EXERCISES: CPT

## 2019-06-27 NOTE — FLOWSHEET NOTE
[place] [address] [facility]  Phone: [phone] Fax: [phone]      Physical Therapy Daily Treatment Note  Date:  2019    Patient Name:  Dong Wilder    :  89  MRN: [id number]  Restrictions/Precautions:    Medical/Treatment Diagnosis Information:  Diagnosis: OA R shoulder   Treatment Diagnosis: R shoulder OA M19.011, R shoulder pain M25.511, incomplete tear R shoulder RTC T01.438  Insurance/Certification information:  PT Insurance Information: Medicare/AARP  Physician Information:  Referring Practitioner: Dr. Oliverio Sapp of care signed (Y/N):     Date of Patient follow up with Physician: 2019    G-Code (if applicable):      Date G-Code Applied:  2019   Quick Dash 68% disability    Progress Note: [x]  Yes  []  No  Next due by: Visit #10      Latex Allergy:  [x]NO      []YES  Preferred Language for Healthcare:   [x]English       []other:    Visit # Insurance Allowable   4 Medicare/AARP     Pain level:  5/10     SUBJECTIVE:  Patient reports that she has been doing her HEP. Did not shower yet this morning so muscles aren't loose yet this morning. Felt fine after last session -had a little bit of soreness.      OBJECTIVE: increased crepitus of GH joint > 90 degrees elevation  Observation:   Test measurements:      RESTRICTIONS/PRECAUTIONS: prior TKR and B masectomy    Exercises/Interventions:   Therapeutic Ex (58028): 35 min Sets/sec Reps CUES/Notes   UBE      Pendulum/Ball rolls      Cane AAROM flex/press 1 20 3#; to 90*, press (with popping)   5 way Isomet 5sec 15 All directions   T- band Row/pinch 1 15 green   T- band lower pinch 1 15 green   T- band ER activation 1 10 red   Supine SA punch 1 20 2#   SL ER/SL punch 1 20 1#/0#   Prone Rows/ext 1 20 Row only   Prone HAB/Prone Flex      Wall Slides 1 15    Seated HH Depression      No Money      Scap Wall Lat touches/wall walks            Standing flex/scap Standing Punch      Lawnmower 1 15 2#   UT activation 1 15 2#                     Manual Intervention  ([id number]): 10 min      Shld /GH Mobs 1 10 Grade I-II mobs; PROM all directions   Post Cap mobs      Thoracic/Rib manipualtion      CT MT/Mobs      PROM MT      Elbow mobs            NMR re-education (28239)      T-spine Ext      GH depress/compress      Scap/GH NMR      Body blade      Wall ball roll      Wall Ball bounce      Ball drops      Donna Scap Bio      Floor Snow angels-sliders            Therapeutic Activity (03266)      UE throwing porgression      Dynamic UE stability      Earthquake Bar      Bodyblade                Therapeutic Exercise and NMR EXR  [x] (63319) Provided verbal/tactile cueing for activities related to strengthening, flexibility, endurance, ROM  for improvements in scapular, scapulothoracic and UE control with self care, reaching, carrying, lifting, house/yardwork, driving/computer work. [x] (65522) Provided verbal/tactile cueing for activities related to improving balance, coordination, kinesthetic sense, posture, motor skill, proprioception  to assist with  scapular, scapulothoracic and UE control with self care, reaching, carrying, lifting, house/yardwork, driving/computer work. Therapeutic Activities:    [] (39907 or 17877) Provided verbal/tactile cueing for activities related to improving balance, coordination, kinesthetic sense, posture, motor skill, proprioception and motor activation to allow for proper function of scapular, scapulothoracic and UE control with self care, carrying, lifting, driving/computer work.      Home Exercise Program:    [x] (80370) Reviewed/Progressed HEP activities related to strengthening, flexibility, endurance, ROM of scapular, scapulothoracic and UE control with self care, reaching, carrying, lifting, house/yardwork, driving/computer work  [] (82398) Reviewed/Progressed HEP activities related to improving balance, coordination, kinesthetic sense, posture, motor skill, proprioception of scapular, scapulothoracic and UE control with self care, reaching, carrying, lifting, house/yardwork, driving/computer work      Manual Treatments:  PROM / STM / Oscillations-Mobs:  G-I, II, III, IV (PA's, Inf., Post.)  [x] (80927) Provided manual therapy to mobilize soft tissue/joints of cervical/CT, scapular GHJ and UE for the purpose of modulating pain, promoting relaxation,  increasing ROM, reducing/eliminating soft tissue swelling/inflammation/restriction, improving soft tissue extensibility and allowing for proper ROM for normal function with self care, reaching, carrying, lifting, house/yardwork, driving/computer work    Modalities:      Charges:  Timed Code Treatment Minutes: 45 min   Total Treatment Minutes: 50 min     [] EVAL (LOW) 97531 (typically 20 minutes face-to-face)  [] EVAL (MOD) 67789 (typically 30 minutes face-to-face)  [] EVAL (HIGH) 13259 (typically 45 minutes face-to-face)  [] RE-EVAL     [x] XB(70828) x  2   [] IONTO  [] NMR (16853) x      [] VASO  [x] Manual (43019) x  1    [] Other:  [] TA x       [] Mech Traction (14668)  [] ES(attended) (83421)      [] ES (un) (17518):     GOALS:  Patient stated goal: improve movement    Therapist goals for Patient:   Short Term Goals: To be achieved in: 2 weeks  1. Independent in HEP and progression per patient tolerance, in order to prevent re-injury. 2. Patient will have a decrease in pain to facilitate improvement in movement, function, and ADLs as indicated by Functional Deficits. Long Term Goals: To be achieved in: 6-8 weeks  1. Disability index score of 45% or less for the Quick DASH to assist with reaching prior level of function. 2. Patient will demonstrate increased AROM to 0-120 degrees elevation to allow for proper joint functioning as indicated by Functional Deficits.    3. Patient will demonstrate an increase in NM recruitment/activation and overall GH and scapular strength to within n5lbs HHD or WNL for proper functional mobility as indicated by patients Functional Deficits. 4. Patient will return to reaching/lifting light objects without increased symptoms or restriction. 5. Patient will report being able to take light impact into joint without increased symptoms or restriction. Progression Towards Functional goals:  [x] Patient is progressing as expected towards functional goals listed. [] Progression is slowed due to complexities listed. [] Progression has been slowed due to co-morbidities. [] Plan just implemented, too soon to assess goals progression  [] Other:     ASSESSMENT:  Patient with more crepitus noted throughout exercises today. Crepitus and popping most noted with greater than 90 degrees elevation, both actively and passively. Improvement in scapular activation and SL ER ROM. Added increased exercise for UT and shoulder extension today. Able to progress strengthening further today. Continue to progress strength/ROM per tolerance.      Return to Play: (if applicable)   []  Stage 1: Intro to Strength   []  Stage 2: Dynamic Strength and Intro to Plyometrics   []  Stage 3: Advanced Plyometrics and Intro to Throwing   []  Stage 4: Sport specific Training/Return to Sport     []  Ready to Return to Play, [employer]   []  Not Ready for Return to Sports   Comments:      Treatment/Activity Tolerance:  [x] Patient tolerated treatment well [] Patient limited by fatique  [] Patient limited by pain  [] Patient limited by other medical complications  [] Other:     Prognosis: [x] Good [] Fair  [] Poor    Patient Requires Follow-up: [x] Yes  [] No    PLAN: Continue to progress strength and ROM per pt tolerance  [x] Continue per plan of care [] Alter current plan (see comments)  [] Plan of care initiated [] Hold pending MD visit [] Discharge    Electronically signed by: Geraldo Lockhart

## 2019-07-01 ENCOUNTER — HOSPITAL ENCOUNTER (OUTPATIENT)
Dept: PHYSICAL THERAPY | Age: 79
Setting detail: THERAPIES SERIES
Discharge: HOME | End: 2019-07-01
Payer: MEDICARE

## 2019-07-01 PROCEDURE — 97140 MANUAL THERAPY 1/> REGIONS: CPT

## 2019-07-01 PROCEDURE — 97110 THERAPEUTIC EXERCISES: CPT

## 2019-07-03 ENCOUNTER — HOSPITAL ENCOUNTER (OUTPATIENT)
Dept: PHYSICAL THERAPY | Age: 79
Setting detail: THERAPIES SERIES
Discharge: HOME | End: 2019-07-03
Payer: MEDICARE

## 2019-07-03 PROCEDURE — 97140 MANUAL THERAPY 1/> REGIONS: CPT

## 2019-07-03 PROCEDURE — 97110 THERAPEUTIC EXERCISES: CPT

## 2019-07-03 NOTE — FLOWSHEET NOTE
[place] [address] [address] [place]  Phone: [phone] Fax: [phone]      Physical Therapy Daily Treatment Note  Date:  7/3/2019    Patient Name:  Bonilla Ocampo    :    MRN: [id number]  Restrictions/Precautions:    Medical/Treatment Diagnosis Information:  Diagnosis: OA R shoulder   Treatment Diagnosis: R shoulder OA M19.011, R shoulder pain M25.511, incomplete tear R shoulder RTC G30.619  Insurance/Certification information:  PT Insurance Information: Medicare/AARP  Physician Information:  Referring Practitioner: Dr. Lesly Son of care signed (Y/N):     Date of Patient follow up with Physician: 2019    G-Code (if applicable):      Date G-Code Applied:  2019   Quick Dash 68% disability    Progress Note: [x]  Yes  []  No  Next due by: Visit #10      Latex Allergy:  [x]NO      []YES  Preferred Language for Healthcare:   [x]English       []other:    Visit # Insurance Allowable   6 Medicare/AARP     Pain level: 6 /10     SUBJECTIVE:  Patient reports that she felt pretty good after last session. Is very sore and shoulder bothering her more today. Feels more along top of shoulder and in joint today.        OBJECTIVE: increased crepitus of GH joint > 90 degrees elevation  Observation:   Test measurements:      RESTRICTIONS/PRECAUTIONS: prior TKR and B masectomy    Exercises/Interventions:   Therapeutic Ex (09390): 45 min Sets/sec Reps CUES/Notes   UBE 1 4    Pendulum/Ball rolls      Cane AAROM press/ER 1 20 3#; to 90*, press (with popping)   5 way Isomet 5sec 20 All directions   T- band Row/pinch 1 20 green   T- band lower pinch 1 20 green   T- band ER activation 1 20 red   Supine SA punch 1 20 2#   SL ER/SL punch 1 20 1#/0#   Prone Rows/ext 1 20 Row only   Prone HAB/Prone Flex      Wall Slides 1 15    Seated HH Depression      No Money      Scap Wall Lat touches/wall walks            Standing flex/scap 1 20 To 90* related to improving balance, coordination, kinesthetic sense, posture, motor skill, proprioception of scapular, scapulothoracic and UE control with self care, reaching, carrying, lifting, house/yardwork, driving/computer work      Manual Treatments:  PROM / STM / Oscillations-Mobs:  G-I, II, III, IV (PA's, Inf., Post.)  [x] (31927) Provided manual therapy to mobilize soft tissue/joints of cervical/CT, scapular GHJ and UE for the purpose of modulating pain, promoting relaxation,  increasing ROM, reducing/eliminating soft tissue swelling/inflammation/restriction, improving soft tissue extensibility and allowing for proper ROM for normal function with self care, reaching, carrying, lifting, house/yardwork, driving/computer work    Modalities:      Charges:  Timed Code Treatment Minutes: 55 min   Total Treatment Minutes: 57 min     [] EVAL (LOW) 97155 (typically 20 minutes face-to-face)  [] EVAL (MOD) 79626 (typically 30 minutes face-to-face)  [] EVAL (HIGH) 37767 (typically 45 minutes face-to-face)  [] RE-EVAL     [x] WJ(33110) x  3   [] IONTO  [] NMR (10202) x      [] VASO  [x] Manual (24138) x  1    [] Other:  [] TA x       [] Mech Traction (97418)  [] ES(attended) (59570)      [] ES (un) (12573):     GOALS:  Patient stated goal: improve movement    Therapist goals for Patient:   Short Term Goals: To be achieved in: 2 weeks  1. Independent in HEP and progression per patient tolerance, in order to prevent re-injury. 2. Patient will have a decrease in pain to facilitate improvement in movement, function, and ADLs as indicated by Functional Deficits. Long Term Goals: To be achieved in: 6-8 weeks  1. Disability index score of 45% or less for the Quick DASH to assist with reaching prior level of function. 2. Patient will demonstrate increased AROM to 0-120 degrees elevation to allow for proper joint functioning as indicated by Functional Deficits.    3. Patient will demonstrate an increase in NM

## 2019-07-08 ENCOUNTER — HOSPITAL ENCOUNTER (OUTPATIENT)
Dept: PHYSICAL THERAPY | Age: 79
Setting detail: THERAPIES SERIES
Discharge: HOME | End: 2019-07-08
Payer: MEDICARE

## 2019-07-08 PROCEDURE — 97110 THERAPEUTIC EXERCISES: CPT

## 2019-07-08 PROCEDURE — 97140 MANUAL THERAPY 1/> REGIONS: CPT

## 2019-07-08 NOTE — FLOWSHEET NOTE
1 20 Row only   Prone HAB/Prone Flex      Wall Slides 1 15    Seated HH Depression 1 15    No Money      Scap Wall Lat touches/wall walks            Standing flex/scap 1 20 To 90*   Standing Punch np  At 90*   Bicep curl 1 15 2#   Lawnmower 1 15 2#   UT activation 1 15 2#   Body blade 10 10 ER at side   Ball rolls 3 10 Cw/ccw; white ball         Manual Intervention  (55006): 13 min      Shld /GH Mobs 1 10 Grade I-II mobs; PROM all directions   Post Cap mobs      Thoracic/Rib manipualtion      CT MT/Mobs      PROM MT      Elbow mobs      IASTM 1 3 Anterior shoulder   NMR re-education (20119)      T-spine Ext      GH depress/compress      Scap/GH NMR      Body blade      Wall ball roll      Wall Ball bounce      Ball drops      Donna Scap Bio      Floor Snow angels-sliders            Therapeutic Activity (31645)      UE throwing porgression      Dynamic UE stability      Earthquake Bar      Bodyblade                Therapeutic Exercise and NMR EXR  [x] (59063) Provided verbal/tactile cueing for activities related to strengthening, flexibility, endurance, ROM  for improvements in scapular, scapulothoracic and UE control with self care, reaching, carrying, lifting, house/yardwork, driving/computer work. [x] (72098) Provided verbal/tactile cueing for activities related to improving balance, coordination, kinesthetic sense, posture, motor skill, proprioception  to assist with  scapular, scapulothoracic and UE control with self care, reaching, carrying, lifting, house/yardwork, driving/computer work. Therapeutic Activities:    [] (79826 or 02163) Provided verbal/tactile cueing for activities related to improving balance, coordination, kinesthetic sense, posture, motor skill, proprioception and motor activation to allow for proper function of scapular, scapulothoracic and UE control with self care, carrying, lifting, driving/computer work.      Home Exercise Program:    [x] (45549) Reviewed/Progressed HEP activities Requires Follow-up: [x] Yes  [] No    PLAN: Continue to progress strength and ROM per pt tolerance  [x] Continue per plan of care [] Alter current plan (see comments)  [] Plan of care initiated [] Hold pending MD visit [] Discharge    Electronically signed by: Gracia Lilly

## 2019-07-09 ENCOUNTER — NURSE ONLY (OUTPATIENT)
Dept: CARDIOLOGY CLINIC | Age: 79
End: 2019-07-09
Payer: MEDICARE

## 2019-07-09 ENCOUNTER — OFFICE VISIT (OUTPATIENT)
Dept: CARDIOLOGY CLINIC | Age: 79
End: 2019-07-09
Payer: MEDICARE

## 2019-07-09 VITALS
SYSTOLIC BLOOD PRESSURE: 133 MMHG | DIASTOLIC BLOOD PRESSURE: 78 MMHG | HEIGHT: 64 IN | BODY MASS INDEX: 29.71 KG/M2 | HEART RATE: 78 BPM | WEIGHT: 174 LBS

## 2019-07-09 DIAGNOSIS — R06.83 SNORING: ICD-10-CM

## 2019-07-09 DIAGNOSIS — R53.83 OTHER FATIGUE: ICD-10-CM

## 2019-07-09 DIAGNOSIS — R07.9 CHEST PAIN, UNSPECIFIED TYPE: ICD-10-CM

## 2019-07-09 DIAGNOSIS — E78.5 HYPERLIPIDEMIA, UNSPECIFIED HYPERLIPIDEMIA TYPE: ICD-10-CM

## 2019-07-09 DIAGNOSIS — R55 SYNCOPE AND COLLAPSE: ICD-10-CM

## 2019-07-09 DIAGNOSIS — R55 NEAR SYNCOPE: Primary | ICD-10-CM

## 2019-07-09 DIAGNOSIS — I10 ESSENTIAL HYPERTENSION: ICD-10-CM

## 2019-07-09 PROCEDURE — G8427 DOCREV CUR MEDS BY ELIG CLIN: HCPCS | Performed by: INTERNAL MEDICINE

## 2019-07-09 PROCEDURE — 93000 ELECTROCARDIOGRAM COMPLETE: CPT | Performed by: INTERNAL MEDICINE

## 2019-07-09 PROCEDURE — 1090F PRES/ABSN URINE INCON ASSESS: CPT | Performed by: INTERNAL MEDICINE

## 2019-07-09 PROCEDURE — 99204 OFFICE O/P NEW MOD 45 MIN: CPT | Performed by: INTERNAL MEDICINE

## 2019-07-09 PROCEDURE — G8417 CALC BMI ABV UP PARAM F/U: HCPCS | Performed by: INTERNAL MEDICINE

## 2019-07-10 ENCOUNTER — HOSPITAL ENCOUNTER (OUTPATIENT)
Dept: PHYSICAL THERAPY | Age: 79
Setting detail: THERAPIES SERIES
Discharge: HOME | End: 2019-07-10
Payer: MEDICARE

## 2019-07-10 PROCEDURE — 97140 MANUAL THERAPY 1/> REGIONS: CPT

## 2019-07-10 PROCEDURE — 97110 THERAPEUTIC EXERCISES: CPT

## 2019-07-10 NOTE — FLOWSHEET NOTE
Standing Punch np  At 90*   Bicep curl 1 15 2#   Lawnmower 1 15 2#   UT activation 1 15 2#   Body blade 15 6 ER at side   Ball rolls 3 10 Cw/ccw; white ball         Manual Intervention  (48763): 10 min      Shld /GH Mobs 1 10 Grade I-II mobs; PROM all directions   Post Cap mobs      Thoracic/Rib manipualtion      CT MT/Mobs      PROM MT      Elbow mobs      IASTM np  Anterior shoulder   NMR re-education (32827)      T-spine Ext      GH depress/compress      Scap/GH NMR      Body blade      Wall ball roll      Wall Ball bounce      Ball drops      Donna Scap Bio      Floor Snow angels-sliders            Therapeutic Activity (10999)      UE throwing porgression      Dynamic UE stability      Earthquake Bar      Bodyblade                Therapeutic Exercise and NMR EXR  [x] (08458) Provided verbal/tactile cueing for activities related to strengthening, flexibility, endurance, ROM  for improvements in scapular, scapulothoracic and UE control with self care, reaching, carrying, lifting, house/yardwork, driving/computer work. [x] (31137) Provided verbal/tactile cueing for activities related to improving balance, coordination, kinesthetic sense, posture, motor skill, proprioception  to assist with  scapular, scapulothoracic and UE control with self care, reaching, carrying, lifting, house/yardwork, driving/computer work. Therapeutic Activities:    [] (29552 or 40046) Provided verbal/tactile cueing for activities related to improving balance, coordination, kinesthetic sense, posture, motor skill, proprioception and motor activation to allow for proper function of scapular, scapulothoracic and UE control with self care, carrying, lifting, driving/computer work.      Home Exercise Program:    [x] (00109) Reviewed/Progressed HEP activities related to strengthening, flexibility, endurance, ROM of scapular, scapulothoracic and UE control with self care, reaching, carrying, lifting, house/yardwork, driving/computer work  []

## 2019-07-17 ENCOUNTER — HOSPITAL ENCOUNTER (OUTPATIENT)
Dept: PHYSICAL THERAPY | Age: 79
Setting detail: THERAPIES SERIES
Discharge: HOME | End: 2019-07-17
Payer: MEDICARE

## 2019-07-17 PROCEDURE — 97110 THERAPEUTIC EXERCISES: CPT

## 2019-07-17 PROCEDURE — 97140 MANUAL THERAPY 1/> REGIONS: CPT

## 2019-07-17 NOTE — FLOWSHEET NOTE
have a decrease in pain to facilitate improvement in movement, function, and ADLs as indicated by Functional Deficits. Long Term Goals: To be achieved in: 6-8 weeks  1. Disability index score of 45% or less for the Quick DASH to assist with reaching prior level of function. 2. Patient will demonstrate increased AROM to 0-120 degrees elevation to allow for proper joint functioning as indicated by Functional Deficits. 3. Patient will demonstrate an increase in NM recruitment/activation and overall GH and scapular strength to within n5lbs HHD or WNL for proper functional mobility as indicated by patients Functional Deficits. 4. Patient will return to reaching/lifting light objects without increased symptoms or restriction. 5. Patient will report being able to take light impact into joint without increased symptoms or restriction. Progression Towards Functional goals:  [x] Patient is progressing as expected towards functional goals listed. [] Progression is slowed due to complexities listed. [] Progression has been slowed due to co-morbidities. [] Plan just implemented, too soon to assess goals progression  [] Other:     ASSESSMENT:  Patient tolerated all PROM and manual well with good improvement/reduction in pain. Patient continues to do well with progressing strength- able to increase resistance with a few exercises today. Patient with continued decreased crepitus noted throughout PROM and exercises today. Able to increase OH elevation to approx 110 degrees today. Less overall crepitus noted with all active movements. Patient doing much better with overall scapular activation. Continued with increasing compressive loading into GH joint. Continue to progress strength/ROM per tolerance. Update next visit.      Return to Play: (if applicable)   []  Stage 1: Intro to Strength   []  Stage 2: Dynamic Strength and Intro to Plyometrics   []  Stage 3: Advanced Plyometrics and Intro to Throwing   []  Stage

## 2019-07-19 ENCOUNTER — APPOINTMENT (OUTPATIENT)
Dept: PHYSICAL THERAPY | Age: 79
End: 2019-07-19
Payer: MEDICARE

## 2019-07-23 ENCOUNTER — TELEPHONE (OUTPATIENT)
Dept: CARDIOLOGY CLINIC | Age: 79
End: 2019-07-23

## 2019-07-23 NOTE — TELEPHONE ENCOUNTER
She can have vascular screening for 99 dollars--as long as it included BERNARDINO carotid abd screening--[facility]

## 2019-07-25 ENCOUNTER — PROCEDURE VISIT (OUTPATIENT)
Dept: CARDIOLOGY CLINIC | Age: 79
End: 2019-07-25
Payer: MEDICARE

## 2019-07-25 DIAGNOSIS — R55 NEAR SYNCOPE: ICD-10-CM

## 2019-07-25 DIAGNOSIS — R07.9 CHEST PAIN, UNSPECIFIED TYPE: ICD-10-CM

## 2019-07-25 LAB
LV EF: 50 %
LVEF MODALITY: NORMAL

## 2019-07-25 PROCEDURE — 93325 DOPPLER ECHO COLOR FLOW MAPG: CPT | Performed by: INTERNAL MEDICINE

## 2019-07-25 PROCEDURE — 93320 DOPPLER ECHO COMPLETE: CPT | Performed by: INTERNAL MEDICINE

## 2019-07-25 PROCEDURE — 93351 STRESS TTE COMPLETE: CPT | Performed by: INTERNAL MEDICINE

## 2019-08-08 ENCOUNTER — HOSPITAL ENCOUNTER (OUTPATIENT)
Dept: VASCULAR LAB | Age: 79
Discharge: HOME | End: 2019-08-08

## 2019-08-08 DIAGNOSIS — R07.9 CHEST PAIN, UNSPECIFIED TYPE: ICD-10-CM

## 2019-08-08 DIAGNOSIS — R53.83 OTHER FATIGUE: ICD-10-CM

## 2019-08-08 DIAGNOSIS — R55 NEAR SYNCOPE: ICD-10-CM

## 2019-08-08 PROCEDURE — 93228 REMOTE 30 DAY ECG REV/REPORT: CPT | Performed by: INTERNAL MEDICINE

## 2019-08-08 PROCEDURE — 9900000021 HC VASC SCREENING EXAM - SELF PAY

## 2019-08-09 ENCOUNTER — HOSPITAL ENCOUNTER (OUTPATIENT)
Dept: PHYSICAL THERAPY | Age: 79
Setting detail: THERAPIES SERIES
Discharge: HOME | End: 2019-08-09
Payer: MEDICARE

## 2019-08-09 PROCEDURE — 97140 MANUAL THERAPY 1/> REGIONS: CPT

## 2019-08-09 PROCEDURE — 97110 THERAPEUTIC EXERCISES: CPT

## 2019-08-09 NOTE — PLAN OF CARE
Bicep curl 1 15 3#   Lawnmower 1 15 3#   UT activation 1 15 3#   Body blade 15 6 ER at side   Ball rolls 3 10 Cw/ccw; white ball         Manual Intervention  (18435): 10 min      Shld /GH Mobs 1 5 Grade I-II mobs   Post Cap mobs      Thoracic/Rib manipualtion      CT MT/Mobs      PROM MT 1 5 PROM GH joint- flex/ER/IR   Elbow mobs      IASTM np  Anterior shoulder   NMR re-education (30664)      T-spine Ext      GH depress/compress      Scap/GH NMR      Body blade      Wall ball roll      Wall Ball bounce      Ball drops      Donna Scap Bio      Floor Snow angels-sliders            Therapeutic Activity (74333)      UE throwing porgression      Dynamic UE stability      Earthquake Bar      Bodyblade                Therapeutic Exercise and NMR EXR  [x] (88285) Provided verbal/tactile cueing for activities related to strengthening, flexibility, endurance, ROM  for improvements in scapular, scapulothoracic and UE control with self care, reaching, carrying, lifting, house/yardwork, driving/computer work. [x] (75429) Provided verbal/tactile cueing for activities related to improving balance, coordination, kinesthetic sense, posture, motor skill, proprioception  to assist with  scapular, scapulothoracic and UE control with self care, reaching, carrying, lifting, house/yardwork, driving/computer work. Therapeutic Activities:    [] (41191 or 33409) Provided verbal/tactile cueing for activities related to improving balance, coordination, kinesthetic sense, posture, motor skill, proprioception and motor activation to allow for proper function of scapular, scapulothoracic and UE control with self care, carrying, lifting, driving/computer work.      Home Exercise Program:    [x] (55529) Reviewed/Progressed HEP activities related to strengthening, flexibility, endurance, ROM of scapular, scapulothoracic and UE control with self care, reaching, carrying, lifting, house/yardwork, driving/computer work  [] (17850) MET (with compensation)  3. Patient will demonstrate an increase in NM recruitment/activation and overall GH and scapular strength to within n5lbs HHD or WNL for proper functional mobility as indicated by patients Functional Deficits. -75% MET  4. Patient will return to reaching/lifting light objects without increased symptoms or restriction. -50% MET  5. Patient will report being able to take light impact into joint without increased symptoms or restriction.-50% MET    Progression Towards Functional goals:  [x] Patient is progressing as expected towards functional goals listed. [] Progression is slowed due to complexities listed. [] Progression has been slowed due to co-morbidities. [] Plan just implemented, too soon to assess goals progression  [] Other:     ASSESSMENT:  Patient has been seen for a total of 10 visits to date for R shoulder pain associated with OA. Patient has demonstrated moderate improvement in shoulder elevation however is still limited in [place] ER for management of hair as well as IR behind the back. Patient has less crepitus in shoulder; but is consistent with elevation over  degrees. Patient strength has improved as well , but still with greatest limitation in R shoulder ER strength. Overall, patient has been making progress toward goals, however still functionally is not able to do tasks such as curl/comb hair in back of head as well as using arm to pull handle on lazy boy. Today, patient expressed interest in making appointment with shoulder surgeon. Patient wanting to just go for consult- still not sure if she wants to go through surgery. Will continue with HEP and follow up every other week at this time for further progression of strength and compressive loading into [address] [place] [address] joint.       Return to Play: (if applicable)   []  Stage 1: Intro to Strength   []  Stage 2: Dynamic Strength and Intro to Plyometrics   []  Stage 3: Advanced Plyometrics and Intro to Throwing   []  Stage 4: Sport

## 2019-08-14 ENCOUNTER — TELEPHONE (OUTPATIENT)
Dept: CARDIOLOGY CLINIC | Age: 79
End: 2019-08-14

## 2019-08-16 ENCOUNTER — TELEPHONE (OUTPATIENT)
Dept: CARDIOLOGY CLINIC | Age: 79
End: 2019-08-16

## 2019-08-16 ENCOUNTER — OFFICE VISIT (OUTPATIENT)
Dept: ENT CLINIC | Age: 79
End: 2019-08-16
Payer: MEDICARE

## 2019-08-16 VITALS — HEART RATE: 63 BPM | OXYGEN SATURATION: 94 % | SYSTOLIC BLOOD PRESSURE: 136 MMHG | DIASTOLIC BLOOD PRESSURE: 74 MMHG

## 2019-08-16 DIAGNOSIS — H90.3 SENSORINEURAL HEARING LOSS (SNHL) OF BOTH EARS: ICD-10-CM

## 2019-08-16 DIAGNOSIS — H65.02 ACUTE SEROUS OTITIS MEDIA OF LEFT EAR, RECURRENCE NOT SPECIFIED: Primary | ICD-10-CM

## 2019-08-16 PROCEDURE — G8399 PT W/DXA RESULTS DOCUMENT: HCPCS | Performed by: OTOLARYNGOLOGY

## 2019-08-16 PROCEDURE — 1123F ACP DISCUSS/DSCN MKR DOCD: CPT | Performed by: OTOLARYNGOLOGY

## 2019-08-16 PROCEDURE — 1036F TOBACCO NON-USER: CPT | Performed by: OTOLARYNGOLOGY

## 2019-08-16 PROCEDURE — G8417 CALC BMI ABV UP PARAM F/U: HCPCS | Performed by: OTOLARYNGOLOGY

## 2019-08-16 PROCEDURE — 1090F PRES/ABSN URINE INCON ASSESS: CPT | Performed by: OTOLARYNGOLOGY

## 2019-08-16 PROCEDURE — G8427 DOCREV CUR MEDS BY ELIG CLIN: HCPCS | Performed by: OTOLARYNGOLOGY

## 2019-08-16 PROCEDURE — 99203 OFFICE O/P NEW LOW 30 MIN: CPT | Performed by: OTOLARYNGOLOGY

## 2019-08-16 PROCEDURE — 4040F PNEUMOC VAC/ADMIN/RCVD: CPT | Performed by: OTOLARYNGOLOGY

## 2019-08-16 RX ORDER — FLUTICASONE PROPIONATE 50 MCG
2 SPRAY, SUSPENSION (ML) NASAL DAILY
Qty: 1 BOTTLE | Refills: 1 | Status: SHIPPED | OUTPATIENT
Start: 2019-08-16 | End: 2022-09-09

## 2019-08-16 RX ORDER — PREDNISONE 10 MG/1
TABLET ORAL
Qty: 25 TABLET | Refills: 0 | Status: SHIPPED | OUTPATIENT
Start: 2019-08-16 | End: 2019-08-19

## 2019-08-16 RX ORDER — EPINEPHRINE 0.3 MG/.3ML
INJECTION SUBCUTANEOUS
COMMUNITY
Start: 2015-10-24

## 2019-08-16 ASSESSMENT — ENCOUNTER SYMPTOMS
VOICE CHANGE: 0
EYES NEGATIVE: 1
TROUBLE SWALLOWING: 0
SORE THROAT: 0
FACIAL SWELLING: 0
RESPIRATORY NEGATIVE: 1
SINUS PRESSURE: 0
ALLERGIC/IMMUNOLOGIC NEGATIVE: 1
SINUS PAIN: 0
RHINORRHEA: 0

## 2019-08-16 NOTE — PROGRESS NOTES
 Smoking status: Former Smoker     Last attempt to quit: 1995     Years since quittin.6    Smokeless tobacco: Never Used   Substance Use Topics    Alcohol use: No     Alcohol/week: 0.0 standard drinks        Review of Systems:  Review of Systems   Constitutional: Negative for fever. HENT: Positive for hearing loss and tinnitus. Negative for congestion, dental problem, drooling, ear discharge, ear pain, facial swelling, mouth sores, nosebleeds, postnasal drip, rhinorrhea, sinus pressure, sinus pain, sneezing, sore throat, trouble swallowing and voice change. Eyes: Negative. Respiratory: Negative. Cardiovascular: Negative. Endocrine: Negative. Skin: Negative. Allergic/Immunologic: Negative. Neurological: Negative for dizziness. Hematological: Negative. Psychiatric/Behavioral: Negative. OBJECTIVE:  /74   Pulse 63   SpO2 94%   Physical Exam   Constitutional: She is oriented to person, place, and time. She appears well-developed and well-nourished. HENT:   Head: Normocephalic and atraumatic. Right Ear: External ear normal.   Nose: Nose normal.   Mouth/Throat: Oropharynx is clear and moist. No oropharyngeal exudate. Indirect laryngoscopy is unremarkable. Ear examination on the left reveals what appears to be fluid beneath the tympanic membrane. No perforation is noted. Pneumatic otoscopy confirms the findings. Eyes: Pupils are equal, round, and reactive to light. Conjunctivae and EOM are normal.   No nystagmus   Neck: Normal range of motion. Neck supple. No tracheal deviation present. No thyromegaly present. Cardiovascular: Normal rate and regular rhythm. Pulmonary/Chest: Effort normal.   Lymphadenopathy:     She has no cervical adenopathy. Neurological: She is alert and oriented to person, place, and time. Skin: Skin is warm and dry. Psychiatric: She has a normal mood and affect.  Her behavior is normal. Judgment and thought content normal.

## 2019-08-19 ENCOUNTER — OFFICE VISIT (OUTPATIENT)
Dept: PRIMARY CARE CLINIC | Age: 79
End: 2019-08-19
Payer: MEDICARE

## 2019-08-19 VITALS
SYSTOLIC BLOOD PRESSURE: 148 MMHG | HEART RATE: 78 BPM | WEIGHT: 175.1 LBS | BODY MASS INDEX: 30.06 KG/M2 | OXYGEN SATURATION: 97 % | DIASTOLIC BLOOD PRESSURE: 84 MMHG

## 2019-08-19 DIAGNOSIS — R42 DIZZINESS: ICD-10-CM

## 2019-08-19 DIAGNOSIS — I10 ESSENTIAL HYPERTENSION: ICD-10-CM

## 2019-08-19 DIAGNOSIS — M79.2 PERIPHERAL NEUROPATHIC PAIN: Primary | ICD-10-CM

## 2019-08-19 PROCEDURE — 99213 OFFICE O/P EST LOW 20 MIN: CPT | Performed by: INTERNAL MEDICINE

## 2019-08-19 PROCEDURE — G8427 DOCREV CUR MEDS BY ELIG CLIN: HCPCS | Performed by: INTERNAL MEDICINE

## 2019-08-19 PROCEDURE — 1123F ACP DISCUSS/DSCN MKR DOCD: CPT | Performed by: INTERNAL MEDICINE

## 2019-08-19 PROCEDURE — G8399 PT W/DXA RESULTS DOCUMENT: HCPCS | Performed by: INTERNAL MEDICINE

## 2019-08-19 PROCEDURE — 4040F PNEUMOC VAC/ADMIN/RCVD: CPT | Performed by: INTERNAL MEDICINE

## 2019-08-19 PROCEDURE — G8417 CALC BMI ABV UP PARAM F/U: HCPCS | Performed by: INTERNAL MEDICINE

## 2019-08-19 PROCEDURE — 1090F PRES/ABSN URINE INCON ASSESS: CPT | Performed by: INTERNAL MEDICINE

## 2019-08-19 PROCEDURE — 1036F TOBACCO NON-USER: CPT | Performed by: INTERNAL MEDICINE

## 2019-08-19 RX ORDER — FLUTICASONE PROPIONATE 50 MCG
1 SPRAY, SUSPENSION (ML) NASAL DAILY
Qty: 2 BOTTLE | Refills: 1 | Status: SHIPPED | OUTPATIENT
Start: 2019-08-19 | End: 2019-08-30

## 2019-08-19 RX ORDER — GABAPENTIN 300 MG/1
CAPSULE ORAL
Qty: 270 CAPSULE | Refills: 1 | Status: SHIPPED | OUTPATIENT
Start: 2019-08-19 | End: 2019-09-16 | Stop reason: SDUPTHER

## 2019-08-19 ASSESSMENT — ENCOUNTER SYMPTOMS
CHEST TIGHTNESS: 0
CONSTIPATION: 0
DIARRHEA: 0
ABDOMINAL PAIN: 0
NAUSEA: 0
SHORTNESS OF BREATH: 0
COUGH: 0
VOMITING: 0
SINUS PRESSURE: 0

## 2019-08-19 NOTE — PROGRESS NOTES
Amie Root  : 1940  Encounter date: 2019    This tino 67 y.o. female who presents with  Chief Complaint   Patient presents with    3 Month Follow-Up       History of present illness:    HPI Patient presents today for 3 month follow up. Patient reports she is doing well overall. She reports she is tolerating medications well without any side effects. She reports she was prescribed metoprolol 50 mg by cardiology, but has not started taking it yet due to concern of the side effects. She reports that she checks her blood pressure at home is usually 100/80. She reports her balance is off at times, which limits her walking. She is not sure if this is due to the gabapentin or being out of shape or something else. Otherwise she is doing well without any concerns. Allergies   Allergen Reactions    Polidocanol Anaphylaxis    Lescol Other (See Comments)     Myalgias.  Lipitor Other (See Comments)     Muscle pain. Current Outpatient Medications   Medication Sig Dispense Refill    gabapentin (NEURONTIN) 300 MG capsule 1 tab qAM, 2 tabs qHS. Intended supply: 90 days 270 capsule 1    fluticasone (FLONASE) 50 MCG/ACT nasal spray 1 spray by Each Nostril route daily 2 Bottle 1    EPINEPHrine (EPIPEN) 0.3 MG/0.3ML SOAJ injection Use as directed for allergic reaction      fluticasone (FLONASE) 50 MCG/ACT nasal spray 2 sprays by Nasal route daily 1 Bottle 1    triamterene-hydrochlorothiazide (MAXZIDE-25) 37.5-25 MG per tablet TAKE 1 TABLET BY MOUTH DAILY 90 tablet 0    ezetimibe (ZETIA) 10 MG tablet TAKE 1 TABLET BY MOUTH EVERY DAY 90 tablet 0    pramipexole (MIRAPEX) 0.5 MG tablet TAKE 2 TABLETS BY MOUTH EVERY NIGHT 180 tablet 2    Calcium Carbonate-Vitamin D (CALCIUM + D PO) Take 2 tablets by mouth. Take 2 tablets by mouth daily.  Multiple Vitamins-Minerals (CENTRUM SILVER ULTRA WOMENS) TABS Take  by mouth. Take  by mouth daily.       omeprazole (PRILOSEC) 20 MG capsule Take 20 mg by

## 2019-08-23 ENCOUNTER — HOSPITAL ENCOUNTER (OUTPATIENT)
Dept: PHYSICAL THERAPY | Age: 79
Setting detail: THERAPIES SERIES
Discharge: HOME | End: 2019-08-23
Payer: MEDICARE

## 2019-08-23 PROCEDURE — 97140 MANUAL THERAPY 1/> REGIONS: CPT

## 2019-08-23 PROCEDURE — 97110 THERAPEUTIC EXERCISES: CPT

## 2019-08-23 NOTE — FLOWSHEET NOTE
HAB/Prone Flex      Wall Slides 1 15    Seated HH Depression np     No Money      Scap Wall Lat touches/wall walks            Standing flex/scap 1 20 To 100*   Standing Punch np  At 90*   Bicep curl 1 15 3#   Lawnmower 1 15 3#   UT activation 1 15 3#   Body blade 15 6 ER at side   Ball rolls 3 10 Cw/ccw; white ball         Manual Intervention  (04082): 15 min      Shld /GH Mobs 1 7 Grade I-II mobs   Post Cap mobs      Thoracic/Rib manipualtion      CT MT/Mobs      PROM MT 1 8 PROM GH joint- flex/ER/IR   Elbow mobs      IASTM np  Anterior shoulder   NMR re-education (35039)      T-spine Ext      GH depress/compress      Scap/GH NMR      Body blade      Wall ball roll      Wall Ball bounce      Ball drops      Donna Scap Bio      Floor Snow angels-sliders            Therapeutic Activity (60665)      UE throwing porgression      Dynamic UE stability      Earthquake Bar      Bodyblade                Therapeutic Exercise and NMR EXR  [x] (61936) Provided verbal/tactile cueing for activities related to strengthening, flexibility, endurance, ROM  for improvements in scapular, scapulothoracic and UE control with self care, reaching, carrying, lifting, house/yardwork, driving/computer work. [x] (28455) Provided verbal/tactile cueing for activities related to improving balance, coordination, kinesthetic sense, posture, motor skill, proprioception  to assist with  scapular, scapulothoracic and UE control with self care, reaching, carrying, lifting, house/yardwork, driving/computer work. Therapeutic Activities:    [] (18185 or 97355) Provided verbal/tactile cueing for activities related to improving balance, coordination, kinesthetic sense, posture, motor skill, proprioception and motor activation to allow for proper function of scapular, scapulothoracic and UE control with self care, carrying, lifting, driving/computer work.      Home Exercise Program:    [x] (76697) Reviewed/Progressed HEP activities related to

## 2019-08-30 ENCOUNTER — OFFICE VISIT (OUTPATIENT)
Dept: ENT CLINIC | Age: 79
End: 2019-08-30
Payer: MEDICARE

## 2019-08-30 ENCOUNTER — PROCEDURE VISIT (OUTPATIENT)
Dept: AUDIOLOGY | Age: 79
End: 2019-08-30
Payer: MEDICARE

## 2019-08-30 VITALS — DIASTOLIC BLOOD PRESSURE: 74 MMHG | OXYGEN SATURATION: 94 % | HEART RATE: 65 BPM | SYSTOLIC BLOOD PRESSURE: 120 MMHG

## 2019-08-30 DIAGNOSIS — H90.3 SENSORINEURAL HEARING LOSS (SNHL) OF BOTH EARS: ICD-10-CM

## 2019-08-30 DIAGNOSIS — H65.02 ACUTE SEROUS OTITIS MEDIA OF LEFT EAR, RECURRENCE NOT SPECIFIED: Primary | ICD-10-CM

## 2019-08-30 DIAGNOSIS — H69.93 TYPE C TYMPANOGRAM OF BOTH EARS: Primary | ICD-10-CM

## 2019-08-30 DIAGNOSIS — H69.93 DISORDER OF BOTH EUSTACHIAN TUBES: ICD-10-CM

## 2019-08-30 PROCEDURE — 1090F PRES/ABSN URINE INCON ASSESS: CPT | Performed by: OTOLARYNGOLOGY

## 2019-08-30 PROCEDURE — 99213 OFFICE O/P EST LOW 20 MIN: CPT | Performed by: OTOLARYNGOLOGY

## 2019-08-30 PROCEDURE — 92567 TYMPANOMETRY: CPT | Performed by: AUDIOLOGIST

## 2019-08-30 PROCEDURE — 4040F PNEUMOC VAC/ADMIN/RCVD: CPT | Performed by: OTOLARYNGOLOGY

## 2019-08-30 PROCEDURE — 1123F ACP DISCUSS/DSCN MKR DOCD: CPT | Performed by: OTOLARYNGOLOGY

## 2019-08-30 PROCEDURE — G8427 DOCREV CUR MEDS BY ELIG CLIN: HCPCS | Performed by: OTOLARYNGOLOGY

## 2019-08-30 PROCEDURE — 1036F TOBACCO NON-USER: CPT | Performed by: OTOLARYNGOLOGY

## 2019-08-30 PROCEDURE — G8417 CALC BMI ABV UP PARAM F/U: HCPCS | Performed by: OTOLARYNGOLOGY

## 2019-08-30 PROCEDURE — G8399 PT W/DXA RESULTS DOCUMENT: HCPCS | Performed by: OTOLARYNGOLOGY

## 2019-08-30 NOTE — PROGRESS NOTES
Patient referred to ENT for medical clearance of amplification. Tympanogram performed was consistent with negative ear pressure (type C) of both sides. Type C, bilaterally. Re: medical clearance for hearing aids. Address middle ear issue.

## 2019-09-05 ENCOUNTER — HOSPITAL ENCOUNTER (OUTPATIENT)
Dept: PHYSICAL THERAPY | Age: 79
Setting detail: THERAPIES SERIES
Discharge: HOME | End: 2019-09-05
Payer: MEDICARE

## 2019-09-05 PROCEDURE — 97110 THERAPEUTIC EXERCISES: CPT

## 2019-09-05 PROCEDURE — 97140 MANUAL THERAPY 1/> REGIONS: CPT

## 2019-09-05 NOTE — FLOWSHEET NOTE
Patient tolerated treatment well [] Patient limited by fatique  [] Patient limited by pain  [] Patient limited by other medical complications  [] Other:     Prognosis: [x] Good [] Fair  [] Poorr    Patient Requires Follow-up: [x] Yes  [] No    PLAN: Continue to progress strength and ROM per pt tolerance  [x] Continue per plan of care [] Alter current plan (see comments)  [] Plan of care initiated [] Hold pending MD visit [] Discharge    Electronically signed by: Sudheer Vivas

## 2019-09-16 ENCOUNTER — OFFICE VISIT (OUTPATIENT)
Dept: PRIMARY CARE CLINIC | Age: 79
End: 2019-09-16
Payer: MEDICARE

## 2019-09-16 VITALS
OXYGEN SATURATION: 98 % | DIASTOLIC BLOOD PRESSURE: 80 MMHG | HEART RATE: 67 BPM | SYSTOLIC BLOOD PRESSURE: 124 MMHG | WEIGHT: 175.1 LBS | BODY MASS INDEX: 30.06 KG/M2

## 2019-09-16 DIAGNOSIS — I87.2 PERIPHERAL VENOUS INSUFFICIENCY: ICD-10-CM

## 2019-09-16 DIAGNOSIS — M77.8 CAPSULITIS OF RIGHT SHOULDER: Primary | ICD-10-CM

## 2019-09-16 DIAGNOSIS — M79.2 PERIPHERAL NEUROPATHIC PAIN: ICD-10-CM

## 2019-09-16 DIAGNOSIS — E78.5 HYPERLIPIDEMIA, UNSPECIFIED HYPERLIPIDEMIA TYPE: ICD-10-CM

## 2019-09-16 DIAGNOSIS — I10 ESSENTIAL HYPERTENSION: ICD-10-CM

## 2019-09-16 PROCEDURE — G8417 CALC BMI ABV UP PARAM F/U: HCPCS | Performed by: INTERNAL MEDICINE

## 2019-09-16 PROCEDURE — 1090F PRES/ABSN URINE INCON ASSESS: CPT | Performed by: INTERNAL MEDICINE

## 2019-09-16 PROCEDURE — 1123F ACP DISCUSS/DSCN MKR DOCD: CPT | Performed by: INTERNAL MEDICINE

## 2019-09-16 PROCEDURE — 1036F TOBACCO NON-USER: CPT | Performed by: INTERNAL MEDICINE

## 2019-09-16 PROCEDURE — 4040F PNEUMOC VAC/ADMIN/RCVD: CPT | Performed by: INTERNAL MEDICINE

## 2019-09-16 PROCEDURE — 99213 OFFICE O/P EST LOW 20 MIN: CPT | Performed by: INTERNAL MEDICINE

## 2019-09-16 PROCEDURE — G8399 PT W/DXA RESULTS DOCUMENT: HCPCS | Performed by: INTERNAL MEDICINE

## 2019-09-16 PROCEDURE — G8427 DOCREV CUR MEDS BY ELIG CLIN: HCPCS | Performed by: INTERNAL MEDICINE

## 2019-09-16 RX ORDER — GABAPENTIN 100 MG/1
CAPSULE ORAL
Qty: 270 CAPSULE | Refills: 3 | Status: SHIPPED | OUTPATIENT
Start: 2019-09-16 | End: 2019-11-11

## 2019-09-16 ASSESSMENT — ENCOUNTER SYMPTOMS
SHORTNESS OF BREATH: 0
COUGH: 0

## 2019-09-16 NOTE — PATIENT INSTRUCTIONS
Gabapentin titration: Each step should take 3-4 weeks  300mg in the morning and 300mg in the evening  Alternating 100 and 200mg in the morning and 300mg at night  100mg in the morning and 200mg at night  100mg once a day  Stop completely

## 2019-09-20 ENCOUNTER — HOSPITAL ENCOUNTER (OUTPATIENT)
Dept: PHYSICAL THERAPY | Age: 79
Setting detail: THERAPIES SERIES
Discharge: HOME | End: 2019-09-20
Payer: MEDICARE

## 2019-09-20 PROCEDURE — 9990000010 HC NO CHARGE VISIT

## 2019-10-22 ENCOUNTER — OFFICE VISIT (OUTPATIENT)
Dept: CARDIOLOGY CLINIC | Age: 79
End: 2019-10-22
Payer: MEDICARE

## 2019-10-22 VITALS
DIASTOLIC BLOOD PRESSURE: 80 MMHG | BODY MASS INDEX: 30.37 KG/M2 | HEART RATE: 76 BPM | SYSTOLIC BLOOD PRESSURE: 120 MMHG | HEIGHT: 64 IN | WEIGHT: 177.9 LBS

## 2019-10-22 DIAGNOSIS — R06.83 SNORING: Primary | ICD-10-CM

## 2019-10-22 DIAGNOSIS — I65.23 OCCLUSION AND STENOSIS OF BILATERAL CAROTID ARTERIES: ICD-10-CM

## 2019-10-22 DIAGNOSIS — I10 ESSENTIAL HYPERTENSION: ICD-10-CM

## 2019-10-22 DIAGNOSIS — E78.5 HYPERLIPIDEMIA, UNSPECIFIED HYPERLIPIDEMIA TYPE: ICD-10-CM

## 2019-10-22 PROCEDURE — 1123F ACP DISCUSS/DSCN MKR DOCD: CPT | Performed by: INTERNAL MEDICINE

## 2019-10-22 PROCEDURE — G8484 FLU IMMUNIZE NO ADMIN: HCPCS | Performed by: INTERNAL MEDICINE

## 2019-10-22 PROCEDURE — G8399 PT W/DXA RESULTS DOCUMENT: HCPCS | Performed by: INTERNAL MEDICINE

## 2019-10-22 PROCEDURE — G8417 CALC BMI ABV UP PARAM F/U: HCPCS | Performed by: INTERNAL MEDICINE

## 2019-10-22 PROCEDURE — G8599 NO ASA/ANTIPLAT THER USE RNG: HCPCS | Performed by: INTERNAL MEDICINE

## 2019-10-22 PROCEDURE — 1036F TOBACCO NON-USER: CPT | Performed by: INTERNAL MEDICINE

## 2019-10-22 PROCEDURE — 99214 OFFICE O/P EST MOD 30 MIN: CPT | Performed by: INTERNAL MEDICINE

## 2019-10-22 PROCEDURE — 1090F PRES/ABSN URINE INCON ASSESS: CPT | Performed by: INTERNAL MEDICINE

## 2019-10-22 PROCEDURE — G8427 DOCREV CUR MEDS BY ELIG CLIN: HCPCS | Performed by: INTERNAL MEDICINE

## 2019-10-22 PROCEDURE — 4040F PNEUMOC VAC/ADMIN/RCVD: CPT | Performed by: INTERNAL MEDICINE

## 2019-11-06 ENCOUNTER — NURSE TRIAGE (OUTPATIENT)
Dept: OTHER | Facility: CLINIC | Age: 79
End: 2019-11-06

## 2019-11-06 ENCOUNTER — OFFICE VISIT (OUTPATIENT)
Dept: PRIMARY CARE CLINIC | Age: 79
End: 2019-11-06
Payer: MEDICARE

## 2019-11-06 VITALS
BODY MASS INDEX: 30.64 KG/M2 | SYSTOLIC BLOOD PRESSURE: 132 MMHG | WEIGHT: 178.5 LBS | DIASTOLIC BLOOD PRESSURE: 80 MMHG | HEART RATE: 81 BPM | OXYGEN SATURATION: 97 %

## 2019-11-06 DIAGNOSIS — M25.561 ACUTE PAIN OF RIGHT KNEE: Primary | ICD-10-CM

## 2019-11-06 PROCEDURE — 1123F ACP DISCUSS/DSCN MKR DOCD: CPT | Performed by: NURSE PRACTITIONER

## 2019-11-06 PROCEDURE — G8417 CALC BMI ABV UP PARAM F/U: HCPCS | Performed by: NURSE PRACTITIONER

## 2019-11-06 PROCEDURE — G8599 NO ASA/ANTIPLAT THER USE RNG: HCPCS | Performed by: NURSE PRACTITIONER

## 2019-11-06 PROCEDURE — 99213 OFFICE O/P EST LOW 20 MIN: CPT | Performed by: NURSE PRACTITIONER

## 2019-11-06 PROCEDURE — 1036F TOBACCO NON-USER: CPT | Performed by: NURSE PRACTITIONER

## 2019-11-06 PROCEDURE — 1090F PRES/ABSN URINE INCON ASSESS: CPT | Performed by: NURSE PRACTITIONER

## 2019-11-06 PROCEDURE — G8427 DOCREV CUR MEDS BY ELIG CLIN: HCPCS | Performed by: NURSE PRACTITIONER

## 2019-11-06 PROCEDURE — G8484 FLU IMMUNIZE NO ADMIN: HCPCS | Performed by: NURSE PRACTITIONER

## 2019-11-06 PROCEDURE — G8399 PT W/DXA RESULTS DOCUMENT: HCPCS | Performed by: NURSE PRACTITIONER

## 2019-11-06 PROCEDURE — 4040F PNEUMOC VAC/ADMIN/RCVD: CPT | Performed by: NURSE PRACTITIONER

## 2019-11-07 ENCOUNTER — TELEPHONE (OUTPATIENT)
Dept: FAMILY MEDICINE CLINIC | Age: 79
End: 2019-11-07

## 2019-11-07 ASSESSMENT — ENCOUNTER SYMPTOMS
NAUSEA: 0
SHORTNESS OF BREATH: 0
COUGH: 0
DIARRHEA: 0
VOMITING: 0

## 2019-11-11 ENCOUNTER — OFFICE VISIT (OUTPATIENT)
Dept: PRIMARY CARE CLINIC | Age: 79
End: 2019-11-11
Payer: MEDICARE

## 2019-11-11 VITALS
HEART RATE: 82 BPM | HEIGHT: 64 IN | TEMPERATURE: 98.5 F | WEIGHT: 177.4 LBS | OXYGEN SATURATION: 97 % | SYSTOLIC BLOOD PRESSURE: 128 MMHG | DIASTOLIC BLOOD PRESSURE: 74 MMHG | BODY MASS INDEX: 30.29 KG/M2

## 2019-11-11 DIAGNOSIS — Z01.818 PRE-OP EXAMINATION: Primary | ICD-10-CM

## 2019-11-11 DIAGNOSIS — Z23 NEED FOR VACCINATION: ICD-10-CM

## 2019-11-11 PROCEDURE — G8484 FLU IMMUNIZE NO ADMIN: HCPCS | Performed by: NURSE PRACTITIONER

## 2019-11-11 PROCEDURE — 1090F PRES/ABSN URINE INCON ASSESS: CPT | Performed by: NURSE PRACTITIONER

## 2019-11-11 PROCEDURE — G0008 ADMIN INFLUENZA VIRUS VAC: HCPCS | Performed by: NURSE PRACTITIONER

## 2019-11-11 PROCEDURE — G8427 DOCREV CUR MEDS BY ELIG CLIN: HCPCS | Performed by: NURSE PRACTITIONER

## 2019-11-11 PROCEDURE — G8417 CALC BMI ABV UP PARAM F/U: HCPCS | Performed by: NURSE PRACTITIONER

## 2019-11-11 PROCEDURE — 99212 OFFICE O/P EST SF 10 MIN: CPT | Performed by: NURSE PRACTITIONER

## 2019-11-11 PROCEDURE — 90653 IIV ADJUVANT VACCINE IM: CPT | Performed by: NURSE PRACTITIONER

## 2019-11-11 RX ORDER — GABAPENTIN 300 MG/1
300 CAPSULE ORAL 2 TIMES DAILY
COMMUNITY
End: 2020-11-05

## 2019-11-11 RX ORDER — HYDROCHLOROTHIAZIDE 25 MG/1
25 TABLET ORAL DAILY
COMMUNITY

## 2020-05-13 ENCOUNTER — TELEPHONE (OUTPATIENT)
Dept: FAMILY MEDICINE CLINIC | Age: 80
End: 2020-05-13

## 2020-05-13 NOTE — TELEPHONE ENCOUNTER
Patient was read the following consent and agreed to the virtual visit. We want to confirm that, for purposes of billing, this is a virtual visit with your provider for which we will submit a claim for reimbursement with your insurance company. You will be responsible for any copays, coinsurance amounts or other amounts not covered by your insurance company. If you do not accept this, unfortunately we will not be able to schedule a virtual visit with the provider. Do you accept?      Patient read consent over the phone and accepted.     Please text link to [phone]

## 2020-05-15 ENCOUNTER — VIRTUAL VISIT (OUTPATIENT)
Dept: FAMILY MEDICINE CLINIC | Age: 80
End: 2020-05-15
Payer: MEDICARE

## 2020-05-15 PROCEDURE — 99214 OFFICE O/P EST MOD 30 MIN: CPT | Performed by: NURSE PRACTITIONER

## 2020-05-15 RX ORDER — NAPROXEN 500 MG/1
500 TABLET ORAL 2 TIMES DAILY WITH MEALS
Qty: 30 TABLET | Refills: 0 | Status: SHIPPED | OUTPATIENT
Start: 2020-05-15 | End: 2022-09-09

## 2020-05-15 NOTE — PROGRESS NOTES
Evelyn Ken  : [id number]  Encounter date: 5/15/2020    This tino 79 y.o. female who is being seen Virtually using [employer]. me. Patient is at home and MELANIE Tatum is at the office. The patient has consented to having a virtual visit due to the [place] 19 pandemic. Chief Complaint   Patient presents with    Toe Pain     History of present illness:    HPI   1. Presents virtually with concerns with bilateral great toe swelling/pain L>R - started approximately 6 months prior. Per patient has been pretty stable over the past 6 months. Can wear shoes with difficulty - uncomfortable. Reports okay with walking. Reports tender and hot to touch - worsens at night time. Denies any hx of gout. Has been taking Tylenol with some short term relief. Allergies   Allergen Reactions    Polidocanol Anaphylaxis    Lescol Other (See Comments)     Myalgias.  Lipitor Other (See Comments)     Muscle pain. Current Outpatient Medications   Medication Sig Dispense Refill    naproxen (NAPROSYN) 500 MG tablet Take 1 tablet by mouth 2 times daily (with meals) 30 tablet 0    gabapentin (NEURONTIN) 300 MG capsule Take 300 mg by mouth 2 times daily.  hydrochlorothiazide (HYDRODIURIL) 25 MG tablet Take 25 mg by mouth daily      TRIAMTERENE PO Take 37.5 mg by mouth daily      EPINEPHrine (EPIPEN) 0.3 MG/0.3ML SOAJ injection Use as directed for allergic reaction      fluticasone (FLONASE) 50 MCG/ACT nasal spray 2 sprays by Nasal route daily 1 Bottle 1    ezetimibe (ZETIA) 10 MG tablet TAKE 1 TABLET BY MOUTH EVERY DAY 90 tablet 0    Calcium Carbonate-Vitamin D (CALCIUM + D PO) Take 2 tablets by mouth. Take 2 tablets by mouth daily.  Multiple Vitamins-Minerals (CENTRUM SILVER ULTRA WOMENS) TABS Take  by mouth. Take  by mouth daily.  omeprazole (PRILOSEC) 20 MG capsule Take 20 mg by mouth daily. No current facility-administered medications for this visit.        Review of Systems hypercholesterolemia  - Lipid, Fasting; Future    4. Pre-diabetes  Encouraged healthy diet and lifestyle changes. - Hemoglobin A1C; Future     Sarah Levi was counseled regarding symptoms of current diagnosis, course and complications of disease if inadequately treated. Discussed side effects of medications, diagnosis, treatment options, and prognosis along with risks, benefits, complications, and alternatives of treatment including labs, imaging and other studies/treatment targets and goals. She verbalized understanding of instructions and counseling. Return in about 3 months (around 8/15/2020). Pursuant to the emergency declaration under the [facility], [facility] waiver authority and the [employer] Act, this Virtual  Visit was conducted, with patient's consent, to reduce the patient's risk of exposure to COVID-19 and provide continuity of care for an established patient. Services were provided through a video synchronous discussion virtually to substitute for in-person clinic visit. Patient was instructed that the AVS is available on My Chart or was emailed to the patient if not on My Chart. Lab orders were emailed to patient if they do not use a [address] [facility] lab. Any work notes were sent to patient through My Chart or e-mail.

## 2020-05-18 ENCOUNTER — HOSPITAL ENCOUNTER (OUTPATIENT)
Age: 80
Discharge: HOME | End: 2020-05-18
Payer: MEDICARE

## 2020-05-18 ENCOUNTER — HOSPITAL ENCOUNTER (OUTPATIENT)
Dept: GENERAL RADIOLOGY | Age: 80
Discharge: HOME | End: 2020-05-18
Payer: MEDICARE

## 2020-05-18 PROCEDURE — 73630 X-RAY EXAM OF FOOT: CPT

## 2020-05-19 ENCOUNTER — TELEPHONE (OUTPATIENT)
Dept: FAMILY MEDICINE CLINIC | Age: 80
End: 2020-05-19

## 2020-05-19 ASSESSMENT — ENCOUNTER SYMPTOMS
NAUSEA: 0
COUGH: 0
DIARRHEA: 0
VOMITING: 0
SHORTNESS OF BREATH: 0

## 2020-05-19 NOTE — TELEPHONE ENCOUNTER
Made follow up phone call to patient. Reviewed foot xray results and most likely dx of Gouty arthritis. Per patient did not yet complete blood work because she was not fasting at the time. Will plan to go tomorrow morning to have completed. If uric acid level elevated will start Allopurinol.

## 2020-05-20 ENCOUNTER — HOSPITAL ENCOUNTER (OUTPATIENT)
Age: 80
Discharge: HOME | End: 2020-05-20
Payer: MEDICARE

## 2020-05-20 ENCOUNTER — TELEPHONE (OUTPATIENT)
Dept: FAMILY MEDICINE CLINIC | Age: 80
End: 2020-05-20

## 2020-05-20 LAB
A/G RATIO: 1.4 (ref 1.1–2.2)
ALBUMIN SERPL-MCNC: 4.2 G/DL (ref 3.4–5)
ALP BLD-CCNC: 71 U/L (ref 40–129)
ALT SERPL-CCNC: 21 U/L (ref 10–40)
ANION GAP SERPL CALCULATED.3IONS-SCNC: 10 MMOL/L (ref 3–16)
AST SERPL-CCNC: 27 U/L (ref 15–37)
BASOPHILS ABSOLUTE: 0.1 K/UL (ref 0–0.2)
BASOPHILS RELATIVE PERCENT: 1.1 %
BILIRUB SERPL-MCNC: 0.4 MG/DL (ref 0–1)
BUN BLDV-MCNC: 20 MG/DL (ref 7–20)
CALCIUM SERPL-MCNC: 9.2 MG/DL (ref 8.3–10.6)
CHLORIDE BLD-SCNC: 104 MMOL/L (ref 99–110)
CHOLESTEROL, FASTING: 166 MG/DL (ref 0–199)
CO2: 25 MMOL/L (ref 21–32)
CREAT SERPL-MCNC: 0.6 MG/DL (ref 0.6–1.2)
EOSINOPHILS ABSOLUTE: 0.1 K/UL (ref 0–0.6)
EOSINOPHILS RELATIVE PERCENT: 1.8 %
ESTIMATED AVERAGE GLUCOSE: 114 MG/DL
GFR AFRICAN AMERICAN: >60
GFR NON-AFRICAN AMERICAN: >60
GLOBULIN: 2.9 G/DL
GLUCOSE FASTING: 92 MG/DL (ref 70–99)
HBA1C MFR BLD: 5.6 %
HCT VFR BLD CALC: 36 % (ref 36–48)
HDLC SERPL-MCNC: 62 MG/DL (ref 40–60)
HEMOGLOBIN: 12.1 G/DL (ref 12–16)
LDL CHOLESTEROL CALCULATED: 85 MG/DL
LYMPHOCYTES ABSOLUTE: 1.2 K/UL (ref 1–5.1)
LYMPHOCYTES RELATIVE PERCENT: 22.8 %
MCH RBC QN AUTO: 29.4 PG (ref 26–34)
MCHC RBC AUTO-ENTMCNC: 33.5 G/DL (ref 31–36)
MCV RBC AUTO: 87.5 FL (ref 80–100)
MONOCYTES ABSOLUTE: 0.7 K/UL (ref 0–1.3)
MONOCYTES RELATIVE PERCENT: 12.7 %
NEUTROPHILS ABSOLUTE: 3.3 K/UL (ref 1.7–7.7)
NEUTROPHILS RELATIVE PERCENT: 61.6 %
PDW BLD-RTO: 14.4 % (ref 12.4–15.4)
PLATELET # BLD: 174 K/UL (ref 135–450)
PMV BLD AUTO: 9.1 FL (ref 5–10.5)
POTASSIUM SERPL-SCNC: 4.1 MMOL/L (ref 3.5–5.1)
RBC # BLD: 4.12 M/UL (ref 4–5.2)
SODIUM BLD-SCNC: 139 MMOL/L (ref 136–145)
TOTAL PROTEIN: 7.1 G/DL (ref 6.4–8.2)
TRIGLYCERIDE, FASTING: 95 MG/DL (ref 0–150)
URIC ACID, SERUM: 6.2 MG/DL (ref 2.6–6)
VLDLC SERPL CALC-MCNC: 19 MG/DL
WBC # BLD: 5.3 K/UL (ref 4–11)

## 2020-05-20 PROCEDURE — 84550 ASSAY OF BLOOD/URIC ACID: CPT

## 2020-05-20 PROCEDURE — 85025 COMPLETE CBC W/AUTO DIFF WBC: CPT

## 2020-05-20 PROCEDURE — 80061 LIPID PANEL: CPT

## 2020-05-20 PROCEDURE — 83036 HEMOGLOBIN GLYCOSYLATED A1C: CPT

## 2020-05-20 PROCEDURE — 80053 COMPREHEN METABOLIC PANEL: CPT

## 2020-05-20 PROCEDURE — 36415 COLL VENOUS BLD VENIPUNCTURE: CPT

## 2020-10-29 NOTE — PROGRESS NOTES
[facility]   Cardiac Consultation    Referring Provider:  Yury Bailey         Chief Complaint   Patient presents with    Hypertension    Hyperlipidemia           History of Present Illness:   Ms Shelby Patrick is a 79 y.o. Seen in follow up for a history of htn,hchol. Event monitor showed a run of PAT associated with dizziness. This has not increased in frequency or severity. She has no chest pain, palpitations or change in exertional sob. She is walking 15 minutes every day    Patient is compliant  with medication and is tolerating them well without side effects                        Past Medical History:   has a past medical history of Anaphylactic reaction, Carcinoma in situ of breast, Esophageal reflux, hysterectomy, Lymphedema of left upper extremity, Osteoarthrosis, unspecified whether generalized or localized, unspecified site, Other and unspecified hyperlipidemia, Other extrapyramidal disease and abnormal movement disorder, Unspecified essential hypertension, and Varicose veins of lower extremities with other complications. Surgical History:   has a past surgical history that includes Knee arthroscopy (Right); Hysterectomy; Mammography digital diagnostic bilateral (11/2011); Colonoscopy (1/8/02); Mastectomy (Bilateral, 2016); and Lung biopsy (12/21/2015). Social History:   reports that she quit smoking about 25 years ago. She has never used smokeless tobacco. She reports that she does not drink alcohol or use drugs. Family History:  family history includes Cancer in her father; Diabetes in her maternal uncle; Heart Disease in her father and mother; Hypertension in her father and mother.      Home Medications:  Outpatient Encounter Medications as of 11/5/2020   Medication Sig Dispense Refill    pramipexole (MIRAPEX) 1 MG tablet Take 1 mg by mouth nightly      hydrochlorothiazide (HYDRODIURIL) 25 MG tablet Take 25 mg by mouth daily      TRIAMTERENE PO Take 37.5 mg by mouth daily      fluticasone (FLONASE) 50 MCG/ACT nasal spray 2 sprays by Nasal route daily 1 Bottle 1    ezetimibe (ZETIA) 10 MG tablet TAKE 1 TABLET BY MOUTH EVERY DAY 90 tablet 0    Calcium Carbonate-Vitamin D (CALCIUM + D PO) Take 2 tablets by mouth. Take 2 tablets by mouth daily.  Multiple Vitamins-Minerals (CENTRUM SILVER ULTRA WOMENS) TABS Take  by mouth. Take  by mouth daily.  omeprazole (PRILOSEC) 20 MG capsule Take 20 mg by mouth daily.  naproxen (NAPROSYN) 500 MG tablet Take 1 tablet by mouth 2 times daily (with meals) 30 tablet 0    [DISCONTINUED] gabapentin (NEURONTIN) 300 MG capsule Take 300 mg by mouth 2 times daily.  EPINEPHrine (EPIPEN) 0.3 MG/0.3ML SOAJ injection Use as directed for allergic reaction       No facility-administered encounter medications on file as of 11/5/2020. Allergies:  Polidocanol; Lescol; and Lipitor     Review of Systems:   · Constitutional: there has been no unanticipated weight loss. There's been no change in energy level, sleep pattern, or activity level. · Eyes: No visual changes or diplopia. No scleral icterus. · ENT: No Headaches, hearing loss or vertigo. No mouth sores or sore throat. · Cardiovascular: Reviewed in HPI  · Respiratory: No cough or wheezing, no sputum production. No hematemesis. · Gastrointestinal: No abdominal pain, appetite loss, blood in stools. No change in bowel or bladder habits. · Genitourinary: No dysuria, trouble voiding, or hematuria. · Musculoskeletal:  No gait disturbance, weakness or joint complaints. · Integumentary: No rash or pruritis. · Neurological: No headache, diplopia, change in muscle strength, numbness or tingling. No change in gait, balance, coordination, mood, affect, memory, mentation, behavior. · Psychiatric: No anxiety, no depression. · Endocrine: No malaise, fatigue or temperature intolerance. No excessive thirst, fluid intake, or urination. No tremor.   · Hematologic/Lymphatic: No abnormal bruising or bleeding, blood clots or swollen lymph nodes. · Allergic/Immunologic: No nasal congestion or hives. Physical Examination:    Vitals:    11/05/20 1429   BP: 136/70   Pulse: 75   SpO2: 96%          Constitutional and General Appearance:   . NAD   SKIN:  .     Warm and dry  EYES:    .     EOMI  Neck:   . Normal carotid contour  Respiratory:  Normal excursion and expansion without use of accessory muscles  Resp Auscultation: Normal breath sounds without dullness  Cardiovascular: The apical impulses not displaced  Heart tones are crisp and normal  Cervical veins are not engorged  Normal S1S2, No S3, No Murmur  Peripheral pulses are symmetrical and full  Extremities: There is no clubbing, cyanosis of the extremities. No edema  Femoral Arteries: 2+ and equal  Pedal Pulses: 2+ and equal   Abdomen:  No masses or tenderness  Liver/Spleen: No Abnormalities Noted  Neurological/Psychiatric:  Alert and oriented in all spheres  No abnormalities of mood, affect, memory      All testing and labs listed below were personally reviewed. Extracranial cerebrovascular, peripheral arterial and abdominal aortic    aneurysm screening was performed.    Carotid: Mild-moderate plaque. Follow up with physician recommended.   Suresh Quiñones: Normal finding.    Ankle brachial index: Normal finding.        Stress echo--neg  Assessment:   PAT  stable  Not interested in EP evaluation at this time    htn  /70 (Site: Left Upper Arm, Position: Sitting, Cuff Size: Medium Adult)   Pulse 75   Ht 5' 4\" (1.626 m)   Wt 178 lb (80.7 kg)   SpO2 96%   BMI 30.55 kg/m²   120/65 at home, better controlled  Will continue to monitor    Carotid disease  8/2019  Carotid doppler mild to mod plaque  Will repeat carotids    Hyperlipidemia  5/2020  Tc 166 hdl 62  ldl 85  Alt 21 ast 27  Tolerates zetia    Snoring  Recommend sleep study   She feels she gets plenty of sleep, 8 pm to 330 am  Does not nap during day      Plan:  Carotid dopplers  Will call if decides to proceed with a sleep study-wants to wait until after COVID-19  Will continue to monitor blood pressure at home  Follow up in one year with Dr Rosana Yanes    Patient has been advised on the importance of regular exercise of at least 20-30 minutes daily alternating with aerobic and isometric activities. Scribe Attestation:  Ryan Alejo, ester scribing for and in the presence of Toni Shukla MD.   Jonathan Mills 11/05/20 3:29 PM   Provider Radha Yin is working as a scribe for and in the presence of keisha Shukla MD). Working as a scribe, Henny Morin may have prepopulated components of this note with my historical  intellectual property under my direct supervision. Any additions to this intellectual property were performed in my presence and at my direction. Furthermore, the content and accuracy of this note have been reviewed by keisha Shukla MD). Shayna William M.D., [facility].

## 2020-11-04 NOTE — PATIENT INSTRUCTIONS
Carotid dopplers  Will call if decides to proceed with a sleep study  Will continue to monitor blood pressure at home

## 2020-11-05 ENCOUNTER — OFFICE VISIT (OUTPATIENT)
Dept: CARDIOLOGY CLINIC | Age: 80
End: 2020-11-05
Payer: MEDICARE

## 2020-11-05 VITALS
HEART RATE: 75 BPM | HEIGHT: 64 IN | BODY MASS INDEX: 30.39 KG/M2 | OXYGEN SATURATION: 96 % | DIASTOLIC BLOOD PRESSURE: 70 MMHG | SYSTOLIC BLOOD PRESSURE: 136 MMHG | WEIGHT: 178 LBS

## 2020-11-05 PROCEDURE — 4040F PNEUMOC VAC/ADMIN/RCVD: CPT | Performed by: INTERNAL MEDICINE

## 2020-11-05 PROCEDURE — 99213 OFFICE O/P EST LOW 20 MIN: CPT | Performed by: INTERNAL MEDICINE

## 2020-11-05 PROCEDURE — G8399 PT W/DXA RESULTS DOCUMENT: HCPCS | Performed by: INTERNAL MEDICINE

## 2020-11-05 PROCEDURE — G8417 CALC BMI ABV UP PARAM F/U: HCPCS | Performed by: INTERNAL MEDICINE

## 2020-11-05 PROCEDURE — 1090F PRES/ABSN URINE INCON ASSESS: CPT | Performed by: INTERNAL MEDICINE

## 2020-11-05 PROCEDURE — G8427 DOCREV CUR MEDS BY ELIG CLIN: HCPCS | Performed by: INTERNAL MEDICINE

## 2020-11-05 PROCEDURE — G8484 FLU IMMUNIZE NO ADMIN: HCPCS | Performed by: INTERNAL MEDICINE

## 2020-11-05 PROCEDURE — 1036F TOBACCO NON-USER: CPT | Performed by: INTERNAL MEDICINE

## 2020-11-05 PROCEDURE — 1123F ACP DISCUSS/DSCN MKR DOCD: CPT | Performed by: INTERNAL MEDICINE

## 2020-11-05 RX ORDER — PRAMIPEXOLE DIHYDROCHLORIDE 1 MG/1
1 TABLET ORAL NIGHTLY
COMMUNITY
Start: 2020-08-27

## 2020-11-12 ENCOUNTER — HOSPITAL ENCOUNTER (OUTPATIENT)
Dept: VASCULAR LAB | Age: 80
Discharge: HOME | End: 2020-11-12
Payer: MEDICARE

## 2020-11-12 PROCEDURE — 93880 EXTRACRANIAL BILAT STUDY: CPT

## 2020-11-18 ENCOUNTER — TELEPHONE (OUTPATIENT)
Dept: CARDIOLOGY CLINIC | Age: 80
End: 2020-11-18

## 2020-11-18 NOTE — TELEPHONE ENCOUNTER
----- Message from Jose Gutiérrez RN sent at 11/18/2020 12:20 PM EST -----  Please call and tell her that her carotids are normal, good news  dgb/mm

## 2022-03-16 NOTE — PROGRESS NOTES
HISTORY OF PRESENT ILLNESS     HPI    Arun is a 54 year old male who presents today for a follow up of his chronic medical issues. He is followed at the [facility] as well.    He has diabetes mellitus type 2 which is under poor control based on his A1C with Jardiance 25 mg daily, glimepiride 4 mg daily, and pioglitazone 45 mg daily.    Prior diabetic medications:  He could not tolerate metformin ER due to blurred vision and was changed to glipizide 5 mg twice daily at the [facility]. This initially helped but his A1C then went up. Invokana was then added and initially helped but his A1C then went back up. Januvia 100 mg daily was then added without significant change in his A1C. Januvia was then discontinued and glipizide changed to glimepiride 4 mg daily. Trulicity 0.75 mg weekly was then added but then stopped due to inefficacy and swelling and itching at the injection sites. Actos 15 mg daily was then added and Invokana increased to 300 mg daily. Actos was then increased to 30 mg daily. Invokana was replaced by Jardiance 10 mg daily due to insurance coverage. The dose was then increased to 25 mg daily and Actos was increased to 45 mg daily.    He checks blood sugars at home 1 times per day and the average is 160s but can get up to 200. Hypoglycemic events are never occurring. His last dilated eye exam was 2/17/2020 at the [facility] and follow up was advised today. There was no retinopathy present. He does not have neuropathy in his feet. He saw diabetic education in the past.     Lab Results   Component Value Date    HGBA1C 7.8 (H) 03/12/2022    HGBA1C 7.4 (H) 11/11/2021    HGBA1C 7.5 (H) 07/09/2021    HGBA1C 7.8 (H) 02/27/2021       His last urine testing revealed no microalbuminuria.    Lab Results   Component Value Date    MALBCR 6.3 03/12/2022    BUN 11 03/12/2022    BUN 13 11/11/2021    BUN 13 07/09/2021    BUN 14 02/27/2021    CREATININE 0.88 03/12/2022    CREATININE  0.88 11/11/2021    CREATININE 0.85 07/09/2021    CREATININE 0.84 02/27/2021    GFRESTIMATE >90 03/12/2022        He has hypertension which is under fair control today with lisinopril 40 mg daily and metoprolol  mg daily. He was previously on losartan 25 mg daily but thought that it was actually increasing his blood pressure. At home it is typically good.    Blood pressure 134/82, pulse 94, resp. rate 16, height 5' 11\" (1.803 m), weight 116.7 kg (257 lb 4.8 oz), SpO2 98 %.    He has hyperlipidemia and his last lipid testing revealed reasonable control with atorvastatin 40 mg daily.    Lab Results   Component Value Date    CHOLESTEROL 191 07/09/2021    CHOLESTEROL 173 06/27/2020    HDL 64 07/09/2021    HDL 56 06/27/2020    CALCLDL 106 07/09/2021    CALCLDL 89 06/27/2020    TRIGLYCERIDE 105 07/09/2021    TRIGLYCERIDE 140 06/27/2020       His liver tests were previously noted to be elevated and ultrasound of the liver showed fatty liver. They subsequently normalized but more recent testing has again showed a mild elevation.    Lab Results   Component Value Date    AST 60 (H) 07/09/2021    AST 79 (H) 02/27/2021    GPT 61 07/09/2021    GPT 76 (H) 02/27/2021    ALKPT 117 07/09/2021    ALKPT 100 02/27/2021    BILIRUBIN 1.4 (H) 07/09/2021    BILIRUBIN 0.7 02/27/2021       He has a history of cerebrovascular accident 6/3/2006. He suffered acute infarction of the right inferior cerebellum and small bilateral thalamic lacunar infarcts due to right vertebral artery dissection in the high cervical and C1-2 portions of the artery. He was treated initially with warfarin and later with Aggrenox but is just on aspirin 81 mg daily currently. He has no residual deficits other than perhaps mild imbalance.    He was treated for clostridium difficile in April of 2017 with resolution of his diarrhea. He notes no recurrence but continues to deal with intermittent loose stools due to irritable bowel syndrome. Levsin as needed  current facility-administered medications for this visit. Review of Systems   Constitutional: Negative for appetite change, chills, fatigue and fever. HENT: Positive for dental problem (right lower jaw - molar pulled) and ear pain (right). Respiratory: Negative for chest tightness and shortness of breath. Cardiovascular: Negative for chest pain and palpitations. Gastrointestinal: Negative for diarrhea, nausea and vomiting. Past medical, surgical, family and social history were reviewed and updated with the patient. Objective:    BP (!) 140/80 (Site: Right Upper Arm, Position: Sitting, Cuff Size: Medium Adult)   Pulse 77   Temp 97.5 °F (36.4 °C) (Tympanic)   Wt 172 lb 12.8 oz (78.4 kg)   SpO2 98%   BMI 29.66 kg/m²   Weight: 172 lb 12.8 oz (78.4 kg)     BP Readings from Last 3 Encounters:   05/04/19 (!) 140/80   11/20/18 126/74   11/14/18 138/72     Wt Readings from Last 3 Encounters:   05/04/19 172 lb 12.8 oz (78.4 kg)   11/20/18 174 lb (78.9 kg)   11/14/18 175 lb (79.4 kg)       Physical Exam   Constitutional: She is oriented to person, place, and time. She appears well-developed and well-nourished. No distress. HENT:   Head: Normocephalic and atraumatic. Right Ear: Hearing, tympanic membrane, external ear and ear canal normal.   Left Ear: Hearing, tympanic membrane, external ear and ear canal normal.   Mouth/Throat:       Cardiovascular: Normal rate, regular rhythm and normal heart sounds. Pulmonary/Chest: Effort normal and breath sounds normal. No respiratory distress. Neurological: She is alert and oriented to person, place, and time. Psychiatric: Judgment and thought content normal.       Assessment/Plan    1. Cellulitis of oral soft tissues  Initiate Amoxicillin. Continue to monitor area for worsening signs of infection. Monitor for fevers. Follow up with Dentist on Monday. - amoxicillin (AMOXIL) 875 MG tablet;  Take 1 tablet by mouth 2 times daily for 10 days Dispense: 20 tablet; Refill: 0       She verbalized understanding of instructions and counseling. Return if symptoms worsen or fail to improve. helps.    He has some chronic pain at the upper aspect of a prior left ankle incision which has been present since his hardware was removed. This has not been too bothersome recently.    He underwent endoscopic right carpal tunnel release on 6/20/2018. This went great but he did develop a rash afterward, presumably from the skin prep. His hand is doing well currently.    He has a Dupuytren contracture of the left hand and very early in the right hand. This is not too bothersome and he is still able to put his hands flat.    He has osteoarthritis of both hips with chronic bilateral hip pain followed by Dr. Coon with period steroid infections with benefit. He received another injection on 2/14/2022.     He noted that he snores and felt tired in the morning. He had occasional pauses in his breathing associated with this. He has yet to discuss this with sleep medicine. He feels that the temperature of his bedroom plays a role in this and notes no major issues with this currently.    He has a history of a chronic, occasionally productive cough. Pulmonary function testing on 12/23/2020 was normal except for a mild increase in airway resistance. On 3/8/2021, he continued to cough and was worse at night and productive of white sputum which was sometimes blood tinged. Chest CT on 3/22/2021 was normal. He established with pulmonology due to his hemoptysis. pulmonary function testing on 4/13/2021 was normal. Bronchoscopy on 4/16/2021 showed erythematous/hypervascular mucosal changes (non-specific). It was advised the he consult ENT for evaluation but he opted to wait on this. He added humidification to his room overnight and the hemoptysis resolved and he has only minimal cough currently.    He was bit by a rabid cat while in [place] on 12/28/2021 and was placed on Augmentin x10 days. He also received the rabies injection series, with the last dose given on 1/11/2022.    He notes some tenderness in both breasts for the last  2 months without swelling or other associated issues.    PREVENTIVE HEALTH:    Diet:  SlimFast for 2 meals per day recently, no soda, low carbohydrate and sugar  Exercise:  not much recently    Last lipid testing:  see above    Last glucose:  see above    Last PSA:  normal    Lab Results   Component Value Date    PSA 0.86 07/09/2021    PSA 0.97 06/27/2020    PSA 1.38 03/30/2019       Immunizations:  COVID-19:  up to date  TDaP/Td:  up to date  Pneumococcal:  up to date  Influenza:  up to date  Zoster:  due for booster    Immunization History   Administered Date(s) Administered   • COVID-19 12Y+ Pfizer-BioNtech - Requires Dilution 03/12/2021, 04/02/2021, 11/15/2021   • Influenza Quadrivalent, MDCK (Flucelvax) 09/19/2017, 10/11/2018   • Influenza, Unspecified Formulation 10/08/2018   • Influenza, injectable, quadrivalent 01/06/2017, 09/11/2019   • Influenza, injectable, quadrivalent, preservative-free 09/19/2017, 11/02/2020, 11/15/2021   • Influenza, seasonal, injectable, trivalent 11/02/2007, 10/07/2013, 09/16/2014   • Pneumococcal polysaccharide, adult, 23 valent 01/19/2016, 07/10/2018   • Rabies - IM fibroblast culture 01/04/2022, 01/11/2022   • Shingles Zoster (Shingrix) 07/15/2021, 11/15/2021   • TD Adult, Unspecified Formulation 11/02/2007   • Tdap 11/02/2007, 11/13/2017, 12/28/2021       Health Maintenance:  Colonoscopy:  7/21/2020, several polyps were found, internal hemorrhoids were noted and follow up was recommended in 5 years    PAST MEDICAL, FAMILY AND SOCIAL HISTORY     I have reviewed the patient's medications and allergies, past medical, surgical, social and family history, updating these as appropriate.  See Histories section of the EMR for a display of this information.    REVIEW OF SYSTEMS     Review of Systems   Constitutional: Positive for fatigue.   All other systems reviewed and are negative.    PHYSICAL EXAM     Physical Exam  Vitals and nursing note reviewed.   Constitutional:       General:  He is not in acute distress.     Appearance: Normal appearance. He is well-developed.   HENT:      Head: Normocephalic and atraumatic.   Eyes:      General: No scleral icterus.  Neck:      Vascular: No carotid bruit or JVD.   Cardiovascular:      Rate and Rhythm: Normal rate and regular rhythm.      Heart sounds: Normal heart sounds. No murmur heard.    No friction rub. No gallop.   Pulmonary:      Effort: Pulmonary effort is normal.      Breath sounds: Normal breath sounds. No wheezing or rales.   Chest:   Breasts:      Right: Tenderness (mild) present. No mass or skin change.      Left: Tenderness (mild) present. No mass or skin change.       Abdominal:      Palpations: Abdomen is soft.      Tenderness: There is no abdominal tenderness.   Musculoskeletal:      Right lower leg: No edema.      Left lower leg: No edema.   Skin:     General: Skin is warm and dry.      Coloration: Skin is not jaundiced.   Neurological:      Mental Status: He is alert. He is not disoriented.       Diabetic Foot Exam Documentation     Abnormal Bilateral Foot Exam:   Right: Skin integrity is negative for erythema, blisters and ulcers, and positive for callosities. Dorsalis pedis and posterial tibial pulses are present. Pressure sensation using the [place] is present.    Left: Skin integrity is negative for erythema, blisters and ulcers, and positive for callosities. Dorsalis pedis and posterial tibial pulses are present. Pressure sensation using the [place] is present.    ASSESSMENT/PLAN     ASSESSMENT:  1. Type 2 diabetes mellitus without complication, without long-term current use of insulin (CMS/[facility])    2. Essential hypertension    3. Pure hypercholesterolemia    4. History of cerebrovascular accident    5. History of right vertebral artery dissection    6. Fatty infiltration of liver    7. Chronic pain of both hips    8. Medication management      PLAN:  Orders Placed This Encounter   • Testosterone, Total, Male   •  CBC with Automated Differential   • Comprehensive Metabolic Panel   • Lipid Panel With Reflex   • PSA   • Glycohemoglobin   • Thyroid Stimulating Hormone Reflex   • lisinopril (ZESTRIL) 40 MG tablet   • atorvastatin (LIPITOR) 40 MG tablet   • metoPROLOL succinate (TOPROL-XL) 100 MG 24 hr tablet   • empagliflozin (JARDIANCE) 25 MG tablet   • glimepiride (AMARYL) 4 MG tablet   • pioglitazone (ACTOS) 45 MG tablet   • metFORMIN (GLUCOPHAGE-XR) 500 MG 24 hr tablet       He wishes to try low dose metformin  mg daily again. I advised lifestyle modifications to help get his A1C down as well.    I will add a testosterone level to his next labs due to his breast tenderness.    His other issues are under acceptable control and I will make no other changes today.    The other medications listed above were refilled today.    I encouraged an updated dilated eye exam.    I will check the labs listed above just prior to his next visit.    Return in about 4 months (around 7/17/2022) for physical and med check, fasting labs just prior to visit.     On 3/17/2022, Shayy STINSON scribed the services personally performed by Seb Weaver DO.    The documentation recorded by the scribe accurately and completely reflects the service(s) Seb STINSON DO, personally performed and the decisions made by me.

## 2022-07-05 ENCOUNTER — PROCEDURE VISIT (OUTPATIENT)
Dept: AUDIOLOGY | Age: 82
End: 2022-07-05
Payer: MEDICARE

## 2022-07-05 ENCOUNTER — OFFICE VISIT (OUTPATIENT)
Dept: ENT CLINIC | Age: 82
End: 2022-07-05
Payer: MEDICARE

## 2022-07-05 VITALS — HEART RATE: 80 BPM | SYSTOLIC BLOOD PRESSURE: 138 MMHG | DIASTOLIC BLOOD PRESSURE: 77 MMHG | TEMPERATURE: 97.3 F

## 2022-07-05 DIAGNOSIS — H69.91 TYPE C TYMPANOGRAM OF RIGHT EAR: Primary | ICD-10-CM

## 2022-07-05 DIAGNOSIS — H65.491 CHRONIC MEE (MIDDLE EAR EFFUSION), RIGHT: ICD-10-CM

## 2022-07-05 DIAGNOSIS — H90.A31 MIXED CONDUCTIVE AND SENSORINEURAL HEARING LOSS OF RIGHT EAR WITH RESTRICTED HEARING OF LEFT EAR: ICD-10-CM

## 2022-07-05 DIAGNOSIS — H69.81 DYSFUNCTION OF RIGHT EUSTACHIAN TUBE: ICD-10-CM

## 2022-07-05 DIAGNOSIS — H65.21 RIGHT CHRONIC SEROUS OTITIS MEDIA: Primary | ICD-10-CM

## 2022-07-05 PROCEDURE — 1123F ACP DISCUSS/DSCN MKR DOCD: CPT | Performed by: STUDENT IN AN ORGANIZED HEALTH CARE EDUCATION/TRAINING PROGRAM

## 2022-07-05 PROCEDURE — G8399 PT W/DXA RESULTS DOCUMENT: HCPCS | Performed by: STUDENT IN AN ORGANIZED HEALTH CARE EDUCATION/TRAINING PROGRAM

## 2022-07-05 PROCEDURE — 1036F TOBACCO NON-USER: CPT | Performed by: STUDENT IN AN ORGANIZED HEALTH CARE EDUCATION/TRAINING PROGRAM

## 2022-07-05 PROCEDURE — G8427 DOCREV CUR MEDS BY ELIG CLIN: HCPCS | Performed by: STUDENT IN AN ORGANIZED HEALTH CARE EDUCATION/TRAINING PROGRAM

## 2022-07-05 PROCEDURE — 1090F PRES/ABSN URINE INCON ASSESS: CPT | Performed by: STUDENT IN AN ORGANIZED HEALTH CARE EDUCATION/TRAINING PROGRAM

## 2022-07-05 PROCEDURE — 31231 NASAL ENDOSCOPY DX: CPT | Performed by: STUDENT IN AN ORGANIZED HEALTH CARE EDUCATION/TRAINING PROGRAM

## 2022-07-05 PROCEDURE — 99214 OFFICE O/P EST MOD 30 MIN: CPT | Performed by: STUDENT IN AN ORGANIZED HEALTH CARE EDUCATION/TRAINING PROGRAM

## 2022-07-05 PROCEDURE — 92567 TYMPANOMETRY: CPT | Performed by: AUDIOLOGIST

## 2022-07-05 PROCEDURE — G8421 BMI NOT CALCULATED: HCPCS | Performed by: STUDENT IN AN ORGANIZED HEALTH CARE EDUCATION/TRAINING PROGRAM

## 2022-07-05 NOTE — PROGRESS NOTES
[address] VISIT      Patient Name: Yinka Vieyra Record Number:  [id number]  Primary Care Physician:  [address]    ChiefComplaint     Chief Complaint   Patient presents with    Hearing Problem     loss of hearing in the Rt ear, had hearing test on 6/3       History of Present Illness     Savage Hale is an 81 y.o. female previously seen for allergies and eustachian tube dysfunction, was last seen by Dr. Shirley Portillo on 8/30/2019. Interval History:   Longstanding hearing loss, has worn hearing aids through [employer] for the past 2.5 years. Over the last 8 months right sided hearing has worsened. Years ago had one of her eardrums \"pierced\" for fluid behind the eardrum. + constant bilateral tinnitus. No otalgia. No otorrhea. No history of chronic ear infections. No family history of early onset hearing loss. No loud noise exposures. Denies exposure to chemotherapy. Former smoker.        Past Medical History     Past Medical History:   Diagnosis Date    Anaphylactic reaction 10/22/2015    Went to ER    Carcinoma in situ of breast     Esophageal reflux     hysterectomy     Lymphedema of left upper extremity 02/2016    Osteoarthrosis, unspecified whether generalized or localized, unspecified site     Other and unspecified hyperlipidemia     Other extrapyramidal disease and abnormal movement disorder     Unspecified essential hypertension     Varicose veins of lower extremities with other complications        Past Surgical History     Past Surgical History:   Procedure Laterality Date    COLONOSCOPY  1/8/02    colonoscopy screening (1/8/2002): mattie Mcginnis 10yr     HYSTERECTOMY (CERVIX STATUS UNKNOWN)      KNEE ARTHROSCOPY Right     rt knee, menisectomy     LUNG BIOPSY  12/21/2015    Cancer    MAMMO IMPLANT DIGITAL DIAG BI  11/2011    mammogram screening 11/2011    MASTECTOMY Bilateral 2016       Family History Family History   Problem Relation Age of Onset    Heart Disease Mother     Hypertension Mother     Heart Disease Father     Hypertension Father     Cancer Father     Diabetes Maternal Uncle        Social History     Social History     Tobacco Use    Smoking status: Former Smoker     Quit date: 1995     Years since quittin.5    Smokeless tobacco: Never Used   Substance Use Topics    Alcohol use: No     Alcohol/week: 0.0 standard drinks    Drug use: No        Allergies     Allergies   Allergen Reactions    Polidocanol Anaphylaxis    Lescol Other (See Comments)     Myalgias.  Lipitor Other (See Comments)     Muscle pain. Medications     Current Outpatient Medications   Medication Sig Dispense Refill    pramipexole (MIRAPEX) 1 MG tablet Take 1 mg by mouth nightly      naproxen (NAPROSYN) 500 MG tablet Take 1 tablet by mouth 2 times daily (with meals) 30 tablet 0    hydrochlorothiazide (HYDRODIURIL) 25 MG tablet Take 25 mg by mouth daily      TRIAMTERENE PO Take 37.5 mg by mouth daily      EPINEPHrine (EPIPEN) 0.3 MG/0.3ML SOAJ injection Use as directed for allergic reaction      ezetimibe (ZETIA) 10 MG tablet TAKE 1 TABLET BY MOUTH EVERY DAY 90 tablet 0    Calcium Carbonate-Vitamin D (CALCIUM + D PO) Take 2 tablets by mouth. Take 2 tablets by mouth daily.  Multiple Vitamins-Minerals (CENTRUM SILVER ULTRA WOMENS) TABS Take  by mouth. Take  by mouth daily.  omeprazole (PRILOSEC) 20 MG capsule Take 20 mg by mouth daily.  fluticasone (FLONASE) 50 MCG/ACT nasal spray 2 sprays by Nasal route daily (Patient not taking: Reported on 2022) 1 Bottle 1     No current facility-administered medications for this visit.        Review of Systems     REVIEW OF SYSTEM: See HPI above    PhysicalExam     Vitals:    22 1343   BP: 138/77   Site: Left Upper Arm   Position: Sitting   Cuff Size: Medium Adult   Pulse: 80   Temp: 97.3 °F (36.3 °C)   TempSrc: Temporal       PHYSICAL EXAM  /77 (Site: Left Upper Arm, Position: Sitting, Cuff Size: Medium Adult)   Pulse 80   Temp 97.3 °F (36.3 °C) (Temporal)     GENERAL: No acute distress, alert and oriented. EYES: EOMI, Anti-icteric. NOSE: On anterior rhinoscopy there is no epistaxis, nasal mucosa moist and normal appearing, no purulent drainage. EARS: Normal external appearance; on portable otomicroscopy:     -Ad: External auditory canal without stenosis, tympanic membrane clear, no middle ear effusions or retractions     -As: External auditory canal without stenosis, tympanic membrane clear, no middle ear effusions or retractions  FACE: HB 1/6 bilaterally, symmetric appearing, sensation equal bilaterally  ORAL CAVITY: No masses or lesions visualized or palpated, uvula is midline, moist mucous membranes, no oropharyngeal masses or oropharyngeal obstruction  NECK: Normal range of motion, no thyromegaly, trachea is midline, no palpable lymphadenopathy or neck masses, no crepitus  CHEST: Normal respiratory effort, breathing comfortably, no retractions  SKIN: No rashes, normal appearing skin, no evidence of skin lesions/tumors  NEURO: Cranial Nerves 2, 3, 4, 5, 6, 7, 11, 12 grossly intact bilaterally     I have performed a head and neck physical exam personally or was physically present during the key or critical portions of the service. Procedure     Nasal Endoscopy, Diagnostic (40796)    Pre-op: Right chronic middle ear effusion  Post-op: Same  Procedure: Nasal endoscopy    After obtaining verbal consent from the patient 2% lidocaine with afrin was sprayed into the nasal cavities. After allowing a time for anesthesia, a flexiblel endoscope was used to examine the nasal septum, turbinates, and mucosal tissue on the right. The nasopharynx including the bilateral torus tubarius were visualized transnasally through the right nasal passage. There were no complications.      Pertinent findings include: No nasopharyngeal lesions, no obstructive lesions of the bilateral eustachian tube orifices along the torus tubarius. *The patient tolerated the procedure well without complication. I attest that I was present for and did the entire procedure myself. Data/Imaging Review     Audiological evaluation performed at [facility] on 6/3/2022 was independently reviewed by myself which demonstrates:    As: Mild to moderately severe sensorineural hearing loss    Ad: Moderate to severe mixed hearing loss with bone-conduction thresholds commensurate with contralateral side    % right, WRS 88% left. Impedance testing performed today demonstrated type C tympanogram on the right, type A tympanogram on the left. Assessment and Plan     1. Right chronic serous otitis media  2. Dysfunction of right eustachian tube  3. Chronic SHARMILA (middle ear effusion), right  4. Mixed conductive and sensorineural hearing loss of right ear with restricted hearing of left ear    Plan:  No obstructive mass lesions on nasal endoscopy  Plan for myringotomy and tympanostomy-tube placement in the office under local anesthesia      Follow-Up     Return if symptoms worsen or fail to improve. Edgar Smith   Department of Otolaryngology/Head and Neck Surgery  7/5/22    Medical Decision Making: The following items were considered in medical decision making:  Independent review of images  Review / order clinical lab tests  Review / order radiology tests  Decision to obtain old records      This note was generated completely or in part utilizing Dragon dictation speech recognition software. Occasionally, words are mistranscribed and despite editing, the text may contain inaccuracies due to incorrect word recognition. If further clarification is needed please contact the office at [phone].

## 2022-07-11 ENCOUNTER — OFFICE VISIT (OUTPATIENT)
Dept: ENT CLINIC | Age: 82
End: 2022-07-11
Payer: MEDICARE

## 2022-07-11 VITALS — SYSTOLIC BLOOD PRESSURE: 146 MMHG | HEART RATE: 66 BPM | DIASTOLIC BLOOD PRESSURE: 82 MMHG | TEMPERATURE: 97.1 F

## 2022-07-11 DIAGNOSIS — H90.A31 MIXED CONDUCTIVE AND SENSORINEURAL HEARING LOSS OF RIGHT EAR WITH RESTRICTED HEARING OF LEFT EAR: ICD-10-CM

## 2022-07-11 DIAGNOSIS — H69.81 DYSFUNCTION OF RIGHT EUSTACHIAN TUBE: Primary | ICD-10-CM

## 2022-07-11 PROCEDURE — 69433 CREATE EARDRUM OPENING: CPT | Performed by: STUDENT IN AN ORGANIZED HEALTH CARE EDUCATION/TRAINING PROGRAM

## 2022-07-11 RX ORDER — OFLOXACIN 3 MG/ML
SOLUTION/ DROPS OPHTHALMIC
Qty: 1 EACH | Refills: 0 | Status: SHIPPED | OUTPATIENT
Start: 2022-07-11 | End: 2022-09-09

## 2022-07-11 NOTE — PROGRESS NOTES
[address] VISIT      Patient Name: Yinka Vieyra Record Number:  [id number]  Primary Care Physician:  [facility]     Chief Complaint   Patient presents with    Ear Problem     tube placement       History of Present Illness     Rayray Hinojosa is an 81 y.o. female previously seen for right chronic serous otitis media with effusion and associated mixed hearing loss. Interval History:   No recent changes in hearing. Right ear still with hearing loss and fullness.      Past Medical History     Past Medical History:   Diagnosis Date    Anaphylactic reaction 10/22/2015    Went to ER    Carcinoma in situ of breast     Esophageal reflux     hysterectomy     Lymphedema of left upper extremity 2016    Osteoarthrosis, unspecified whether generalized or localized, unspecified site     Other and unspecified hyperlipidemia     Other extrapyramidal disease and abnormal movement disorder     Unspecified essential hypertension     Varicose veins of lower extremities with other complications        Past Surgical History     Past Surgical History:   Procedure Laterality Date    COLONOSCOPY  02    colonoscopy screening (2002): mattie Mcginnis 10yr     HYSTERECTOMY (CERVIX STATUS UNKNOWN)      KNEE ARTHROSCOPY Right     rt knee, menisectomy     LUNG BIOPSY  2015    Cancer    MAMMO IMPLANT DIGITAL DIAG BI  2011    mammogram screening 2011    MASTECTOMY Bilateral        Family History     Family History   Problem Relation Age of Onset    Heart Disease Mother     Hypertension Mother     Heart Disease Father     Hypertension Father     Cancer Father     Diabetes Maternal Uncle        Social History     Social History     Tobacco Use    Smoking status: Former Smoker     Quit date: 1995     Years since quittin.5    Smokeless tobacco: Never Used   Substance Use Topics    Alcohol use: No     Alcohol/week: 0.0 standard drinks    Drug use: No        Allergies     Allergies   Allergen Reactions    Polidocanol Anaphylaxis    Lescol Other (See Comments)     Myalgias.  Lipitor Other (See Comments)     Muscle pain. Medications     Current Outpatient Medications   Medication Sig Dispense Refill    ofloxacin (OCUFLOX) 0.3 % solution Place 4 drops in right ear twice a day for the next 3 days. 1 each 0    pramipexole (MIRAPEX) 1 MG tablet Take 1 mg by mouth nightly      naproxen (NAPROSYN) 500 MG tablet Take 1 tablet by mouth 2 times daily (with meals) 30 tablet 0    hydrochlorothiazide (HYDRODIURIL) 25 MG tablet Take 25 mg by mouth daily      TRIAMTERENE PO Take 37.5 mg by mouth daily      EPINEPHrine (EPIPEN) 0.3 MG/0.3ML SOAJ injection Use as directed for allergic reaction      fluticasone (FLONASE) 50 MCG/ACT nasal spray 2 sprays by Nasal route daily (Patient not taking: Reported on 7/5/2022) 1 Bottle 1    ezetimibe (ZETIA) 10 MG tablet TAKE 1 TABLET BY MOUTH EVERY DAY 90 tablet 0    Calcium Carbonate-Vitamin D (CALCIUM + D PO) Take 2 tablets by mouth. Take 2 tablets by mouth daily.  Multiple Vitamins-Minerals (CENTRUM SILVER ULTRA WOMENS) TABS Take  by mouth. Take  by mouth daily.  omeprazole (PRILOSEC) 20 MG capsule Take 20 mg by mouth daily. No current facility-administered medications for this visit. Review of Systems     REVIEW OF SYSTEM: See HPI above    PhysicalExam     Vitals:    07/11/22 1258   BP: (!) 146/82   Site: Left Upper Arm   Position: Sitting   Cuff Size: Medium Adult   Pulse: 66   Temp: 97.1 °F (36.2 °C)   TempSrc: Temporal       PHYSICAL EXAM  BP (!) 146/82 (Site: Left Upper Arm, Position: Sitting, Cuff Size: Medium Adult)   Pulse 66   Temp 97.1 °F (36.2 °C) (Temporal)     GENERAL: No acute distress, alert and oriented. EYES: EOMI, Anti-icteric.   NOSE: On anterior rhinoscopy there is no epistaxis, nasal mucosa moist and normal appearing, no purulent drainage. EARS: Normal external appearance; on portable otomicroscopy:     -Ad: External auditory canal without stenosis, tympanic membrane intact with mild diffuse retraction. No effusion noted     I have performed a head and neck physical exam personally or was physically present during the key or critical portions of the service. Procedure     Procedure: Myringotomy with placement of right-sided pressure equalization tube (CPT 54331)    Pre op Dx: right-sided eustachian tube dysfunction, mixed hearing loss right side  Post Op: Same  Procedure: right-sided myringotomy with tympanostomy tube placement  Consent: Written obtained  Surgeon: Dr. Alanna Huizar    Description of procedure:    Consent was obtained after discussion concerning the risk, benefits, and alternatives of the procedure were discussed with the patient. Patient was placed in the reclined position the procedure chair. With the use of an operating microscopy and an otologic speculum, the right external auditory canal was examined. Any cerumen was removed as necessary using instrumentation. The tympanic membranes was visualize , revealing intact tympanic membrane with mild diffuse retraction. Topical phenol was applied to the anterior-inferior quadrant of right-sided tympanic membrane. A myringotomy knife was used to make a radial incision in the tympanic membrane. A 3-suction was used to inspect the middle ear space which was noted to be dry without effusion. An alligator forceps was used to place a Paparella type PE tube in the myringotomy, followed by 4 drops of Ciprodex otic solution. The patient tolerated the procedure well. There were no complications with the procedure. Assessment and Plan     1. Dysfunction of right eustachian tube  2.  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 Discussed with the patient that her tympanostomy testing in the past demonstrated findings consistent with negative middle ear pressure. Description of the tympanostomy tube placement procedure as well as the associated risk, benefits, alternatives were discussed in detail with the patient. Discussed risk included but not limited to infection, nerve damage, bleeding, damage to surrounding structures, persistent perforation, hearing loss. Consent was signed in the office. Right PE tube placed under local anesthesia without complication  - Floxin GGT to right ear twice daily x3 days.  -She will follow-up in 2 months for repeat comprehensive audiological evaluation and tympanostomy tube check prior to her leaving to [place] during the winter months  - Audiometry with tympanometry; Future      Follow-Up     Return in about 2 months (around 9/11/2022) for audiogram prior to appointment. Edgar Choi   Department of Otolaryngology/Head and Neck Surgery  7/11/22    Medical Decision Making: The following items were considered in medical decision making:  Independent review of images  Review / order clinical lab tests  Review / order radiology tests  Decision to obtain old records      This note was generated completely or in part utilizing Dragon dictation speech recognition software. Occasionally, words are mistranscribed and despite editing, the text may contain inaccuracies due to incorrect word recognition. If further clarification is needed please contact the office at [phone].

## 2022-08-26 ENCOUNTER — NURSE TRIAGE (OUTPATIENT)
Dept: OTHER | Facility: CLINIC | Age: 82
End: 2022-08-26

## 2022-08-26 NOTE — TELEPHONE ENCOUNTER
Received call from Suresh Orta at [facility] with Red Flag Complaint. Subjective: Caller states \"I have been feeling dizzy\"     Current Symptoms: No dizziness at time of call. Some times is dizzy when laying in bed. Mainly occurs when gets up from standing or sitting position. No chest pain or SOB. Woke up 3 nights ago with a heavy pain in her chest.  No chest pain since. No support needed to walk at time of call but did earlier this AM.  Room is spinning and tilting when dizziness occurs. Onset: couple weeks  intermittent    Associated Symptoms: NA    Pain Severity: 0/10; N/A; none    Temperature: denies fever     What has been tried: sitting down. LMP: NA Pregnant: NA    Recommended disposition: See in Office Today    Care advice provided, patient verbalizes understanding; denies any other questions or concerns; instructed to call back for any new or worsening symptoms. Patient/Caller agrees with recommended disposition; writer provided warm transfer to [facility] at [facility] for appointment scheduling     Attention Provider: Thank you for allowing me to participate in the care of your patient. The patient was connected to triage in response to information provided to the ECC/PSC. Please do not respond through this encounter as the response is not directed to a shared pool.     Reason for Disposition   MODERATE dizziness (e.g., interferes with normal activities) (Exception: dizziness caused by heat exposure, sudden standing, or poor fluid intake)    Protocols used: Dizziness-ADULT-[place]

## 2022-08-29 ENCOUNTER — TELEPHONE (OUTPATIENT)
Dept: CARDIOLOGY CLINIC | Age: 82
End: 2022-08-29

## 2022-08-29 NOTE — TELEPHONE ENCOUNTER
Called patient, left message on voicemail to call back so that we could get her scheduled to see someone.   Also left message that if she is currently having chest pain, or, if she is awakened with chest pain, needs to go to the ER

## 2022-08-30 NOTE — TELEPHONE ENCOUNTER
Pt called back left message MFF voice, she would like someone to call her back. Is there a date and time patient can come in for appointment ?     Pls advise thank you

## 2022-08-30 NOTE — TELEPHONE ENCOUNTER
Called patient back. She has had chest pain randomly every 2-3 weeks for the last month or two. Last 1/2 hour, never exertional. No nausea sob or radiation. Associated with diaphoresis. She has unchanged peripheral edema. Able to walk a flight of stairs without stopping, no chest pain or sob. Last episode one week ago, that woke her up. This is what sent her to her pcp for evaluation. She has GERD, but this feels different.   Advise to go to ER for recurrent chest pain  Scheduled with joselyn sept 9 at 230 for evaluation

## 2022-09-07 NOTE — PROGRESS NOTES
[address]  22  Referring: Dr. Jose Cruz Putnam CONSULT/CHIEF COMPLAINT/HPI     Reason for visit/ Chief complaint  New patient  Chest pain, dizziness   HPI Lexie Morris is a 81 y.o. seen as a 2 year follow up for dizziness, chest pressure. She has a history of PAT, hypertension, hyperlipidemia. [facility], has hearing aids that she does not always wear. She has RA, does not treat it because she does not think she has it \"bad enough\"  She had breast cancer in , treated with mastectomy, and 36 tx of radiation. She had a hysterectomy for noncancerous polyps. She has never been pregnant, was on fertility tx for years. She occasionally has a glass of sweet white wine. Was a smoker during college, quit  one year later. She has had multiple orthopedic surgeries. Father had CABG at age 48( he was a a smoker). Mother  of cancer. Siblings all have RA. Lazara Garcia Previously seen by Dr Timo Ceron for PAT, who recommended a sleep study. She did not follow through with this. She has 1-2 month history of random chest pain every 2-3 weeks  Lasting 1/2 hour, never exertional , never associated with sob, radiation. Sometimes associated with diaphoresis. 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 She has one month history dizziness (room spinning),lasting 30 minutes, exacerbated by laying back in bed, turning her head,bending over to tie shoes,or changing positions. Associated with sensation that she could pass out, lasting 2 seconds. Never has passed out Bilateral ear ringing for years, with hearing loss. , has a fullness in her ears. No recent fever,chills, cough. No wheezing. No recent head injury or car accident. She has chest pressure (described as a heaviness) that typically occurs when she is sleeping. Not exacerbated by food or exercise. Not associated with nausea diaphoresis, pain does not radiate. She does have shortness of breath when she paints her house, climbs ladders.  Not as active as she was 5 years ago because she has poor balance. Sometimes walks \"like she is drunk\"due to imbalance. Patient is adherent with medications and is tolerating them well without side effects     HISTORY/ALLERGIES/ROS     MedHx:  has a past medical history of Anaphylactic reaction, Carcinoma in situ of breast, Esophageal reflux, hysterectomy, Lymphedema of left upper extremity, Osteoarthrosis, unspecified whether generalized or localized, unspecified site, Other and unspecified hyperlipidemia, Other extrapyramidal disease and abnormal movement disorder, Unspecified essential hypertension, and Varicose veins of lower extremities with other complications. SurgHx:  has a past surgical history that includes Knee arthroscopy (Right); Hysterectomy; Mammography digital diagnostic bilateral (11/2011); Colonoscopy (1/8/02); Mastectomy (Bilateral, 2016); and Lung biopsy (12/21/2015). SocHx:  reports that she quit smoking about 27 years ago. Her smoking use included cigarettes. She has never used smokeless tobacco. She reports that she does not drink alcohol and does not use drugs. FamHx: Premature cad in dad  Allergies: Polidocanol, Lescol, Lipitor, and Novocain [procaine]   ROS:   Review of Systems   Respiratory:  Positive for shortness of breath. Cardiovascular:  Positive for chest pain. Neurological:  Positive for dizziness. All other systems reviewed and are negative. MEDICATIONS      Prior to Admission medications    Medication Sig Start Date End Date Taking? Authorizing Provider   gabapentin (NEURONTIN) 300 MG capsule Take 300 mg by mouth nightly.    Yes Historical Provider, MD   pramipexole (MIRAPEX) 1 MG tablet Take 1 mg by mouth nightly 8/27/20  Yes Historical Provider, MD   hydrochlorothiazide (HYDRODIURIL) 25 MG tablet Take 25 mg by mouth daily   Yes Historical Provider, MD   TRIAMTERENE PO Take 37.5 mg by mouth daily   Yes Historical Provider, MD   EPINEPHrine (EPIPEN) 0.3 MG/0.3ML SOAJ injection Use as directed for allergic reaction 10/24/15  Yes Historical Provider, MD   ezetimibe (ZETIA) 10 MG tablet TAKE 1 TABLET BY MOUTH EVERY DAY 5/20/19  Yes Liane Gayle MD   Multiple Vitamins-Minerals (CENTRUM SILVER ULTRA WOMENS) TABS Take  by mouth. Take  by mouth daily. Yes Historical Provider, MD   omeprazole (PRILOSEC) 20 MG capsule Take 20 mg by mouth daily. Yes Historical Provider, MD       PHYSICAL EXAM        Vitals:    09/09/22 1425   BP: 124/70   Pulse: 71   SpO2: 98%    Weight: 179 lb 6.4 oz (81.4 kg)     Gen Alert, cooperative, no distress Heart  Regular rate and rhythm, no murmur   Head Normocephalic, atraumatic, no abnormalities Abd  Soft, NT, +BS, no mass, no OM   Eyes PERRLA, conj/corn clear Ext  Ext nl, AT, no C/C, no edema   Nose Nares normal, no drain age, Non-tender Pulse 2+ and symmetric   Throat Lips, mucosa, tongue normal Skin Color/text/turg nl, no rash/lesions   Neck S/S, TM, NT, no bruit Psych Nl mood and affect   Lung CTA-B, unlabored, no DTP NEURO +dixhalepike on right   Ch wall NT, no deform       LABS and Imaging     Relevant and available CV data reviewed  Echo/MRI: 2019--normal left ventricle size, wall thickness and systolic function with an  estimated ejection fraction of 60%. No regional wall motion abnormalities  are seen. E/e\"= 13. Grade I diastolic dysfunction with normal LV filling pressures. Trivial mitral regurgitation. Mild tricuspid regurgitation. PASP 20mmHg.    IVC size is normal (<2.1cm) and collapses > 50% with respiration consistent  with normal RA pressure (3mmHg Cath: Holter:7/2019  PAT associated with lightheadedness  EKG personally interpreted:  NSR  Stress:2019 stress echo- normal  Moderate Risk  Moderate Complexity/Medical Decision Making  Outside/Care everywhere records Reviewed  Labs Reviewed  Prior Imaging, ekg, cath, echo reviewed when available  Medications reviewed  Old Notes reviewed  ASSESSMENT AND PLAN      chest pain  - new problem  -      family history , previous breast radiation, RA, hyperlipidemia  Plan:  -     coronary CTA- will take metoprolol the night prior and 1 hour prior testing    2.   dizziness  -     new problem  -     suspect BPPV, positive dixhallpike on right  -     imbalance  Plan  -     will be seeing ENT on Monday for further evaluation    3. PAT  -    associated with lightheadedness  Plan:  -     no palpitations    4. Essential Hypertension  -    124/70  -stable  Plan  -    continue on hctz 25 mg daily    5. Mixed Hyperlipidemia-  -    5/20/20  tc 166 hdl 62 ldl 85 tri 95  -    9/7/2021 tc 194  tri 100 hdl 65 ldl 111  -    lescol and lipitor caused myalgias  Plan  -    zetia 10 mg daily  - would benefit from statin      6. RA  -    stable  Plan  -    patient has chosen not to medically treat her RA  - needs referral to rheum, she has had 3 joints replaced. She is not asymptomatic        Patient counseled on lifestyle modification, diet, and exercise.     Follow Up:   3 months in [place]    Dr. Brianda Thompson:  I, Brenton Altman, am scribing for and in the presence of Marco Lizarraga MD.

## 2022-09-08 PROBLEM — I47.1 PAT (PAROXYSMAL ATRIAL TACHYCARDIA) (HCC): Status: ACTIVE | Noted: 2022-09-08

## 2022-09-08 PROBLEM — I47.19 PAT (PAROXYSMAL ATRIAL TACHYCARDIA): Status: ACTIVE | Noted: 2022-09-08

## 2022-09-08 PROBLEM — R42 DIZZINESS: Status: ACTIVE | Noted: 2022-09-08

## 2022-09-09 ENCOUNTER — OFFICE VISIT (OUTPATIENT)
Dept: CARDIOLOGY CLINIC | Age: 82
End: 2022-09-09
Payer: MEDICARE

## 2022-09-09 VITALS
HEART RATE: 71 BPM | DIASTOLIC BLOOD PRESSURE: 70 MMHG | WEIGHT: 179.4 LBS | BODY MASS INDEX: 30.63 KG/M2 | OXYGEN SATURATION: 98 % | SYSTOLIC BLOOD PRESSURE: 124 MMHG | HEIGHT: 64 IN

## 2022-09-09 DIAGNOSIS — I47.1 PAT (PAROXYSMAL ATRIAL TACHYCARDIA) (HCC): ICD-10-CM

## 2022-09-09 DIAGNOSIS — R42 DIZZINESS: ICD-10-CM

## 2022-09-09 DIAGNOSIS — R07.9 CHEST PAIN, UNSPECIFIED TYPE: Primary | ICD-10-CM

## 2022-09-09 DIAGNOSIS — I10 ESSENTIAL HYPERTENSION: ICD-10-CM

## 2022-09-09 DIAGNOSIS — E78.2 MIXED HYPERLIPIDEMIA: ICD-10-CM

## 2022-09-09 PROCEDURE — 99204 OFFICE O/P NEW MOD 45 MIN: CPT | Performed by: INTERNAL MEDICINE

## 2022-09-09 PROCEDURE — 1123F ACP DISCUSS/DSCN MKR DOCD: CPT | Performed by: INTERNAL MEDICINE

## 2022-09-09 PROCEDURE — 1090F PRES/ABSN URINE INCON ASSESS: CPT | Performed by: INTERNAL MEDICINE

## 2022-09-09 PROCEDURE — G8417 CALC BMI ABV UP PARAM F/U: HCPCS | Performed by: INTERNAL MEDICINE

## 2022-09-09 PROCEDURE — G8399 PT W/DXA RESULTS DOCUMENT: HCPCS | Performed by: INTERNAL MEDICINE

## 2022-09-09 PROCEDURE — G8427 DOCREV CUR MEDS BY ELIG CLIN: HCPCS | Performed by: INTERNAL MEDICINE

## 2022-09-09 PROCEDURE — 1036F TOBACCO NON-USER: CPT | Performed by: INTERNAL MEDICINE

## 2022-09-09 PROCEDURE — 93000 ELECTROCARDIOGRAM COMPLETE: CPT | Performed by: INTERNAL MEDICINE

## 2022-09-09 RX ORDER — METOPROLOL TARTRATE 50 MG/1
50 TABLET, FILM COATED ORAL 2 TIMES DAILY
Qty: 2 TABLET | Refills: 0 | Status: SHIPPED | OUTPATIENT
Start: 2022-09-09

## 2022-09-09 RX ORDER — GABAPENTIN 300 MG/1
300 CAPSULE ORAL NIGHTLY
COMMUNITY

## 2022-09-09 NOTE — PATIENT INSTRUCTIONS
Cta of chest  - take metoprolol 50 mg the night prior and 1 hour prior testing    Come to ER for worsening chest pain or if becomes short of breath with diaphoresis    Follow up with ENT on [facility] [place]

## 2022-09-12 ENCOUNTER — OFFICE VISIT (OUTPATIENT)
Dept: AUDIOLOGY | Age: 82
End: 2022-09-12
Payer: MEDICARE

## 2022-09-12 ENCOUNTER — OFFICE VISIT (OUTPATIENT)
Dept: ENT CLINIC | Age: 82
End: 2022-09-12
Payer: MEDICARE

## 2022-09-12 VITALS — TEMPERATURE: 97.6 F | SYSTOLIC BLOOD PRESSURE: 144 MMHG | DIASTOLIC BLOOD PRESSURE: 80 MMHG | HEART RATE: 66 BPM

## 2022-09-12 DIAGNOSIS — H90.A31 MIXED CONDUCTIVE AND SENSORINEURAL HEARING LOSS OF RIGHT EAR WITH RESTRICTED HEARING OF LEFT EAR: Primary | ICD-10-CM

## 2022-09-12 DIAGNOSIS — H90.A31 MIXED CONDUCTIVE AND SENSORINEURAL HEARING LOSS OF RIGHT EAR WITH RESTRICTED HEARING OF LEFT EAR: ICD-10-CM

## 2022-09-12 DIAGNOSIS — H69.81 DYSFUNCTION OF RIGHT EUSTACHIAN TUBE: ICD-10-CM

## 2022-09-12 DIAGNOSIS — H81.11 BPPV (BENIGN PAROXYSMAL POSITIONAL VERTIGO), RIGHT: Primary | ICD-10-CM

## 2022-09-12 PROCEDURE — 99213 OFFICE O/P EST LOW 20 MIN: CPT | Performed by: STUDENT IN AN ORGANIZED HEALTH CARE EDUCATION/TRAINING PROGRAM

## 2022-09-12 PROCEDURE — 92557 COMPREHENSIVE HEARING TEST: CPT | Performed by: AUDIOLOGIST

## 2022-09-12 PROCEDURE — 92567 TYMPANOMETRY: CPT | Performed by: AUDIOLOGIST

## 2022-09-12 PROCEDURE — G8427 DOCREV CUR MEDS BY ELIG CLIN: HCPCS | Performed by: STUDENT IN AN ORGANIZED HEALTH CARE EDUCATION/TRAINING PROGRAM

## 2022-09-12 PROCEDURE — 1090F PRES/ABSN URINE INCON ASSESS: CPT | Performed by: STUDENT IN AN ORGANIZED HEALTH CARE EDUCATION/TRAINING PROGRAM

## 2022-09-12 PROCEDURE — G8399 PT W/DXA RESULTS DOCUMENT: HCPCS | Performed by: STUDENT IN AN ORGANIZED HEALTH CARE EDUCATION/TRAINING PROGRAM

## 2022-09-12 PROCEDURE — 1036F TOBACCO NON-USER: CPT | Performed by: STUDENT IN AN ORGANIZED HEALTH CARE EDUCATION/TRAINING PROGRAM

## 2022-09-12 PROCEDURE — G8417 CALC BMI ABV UP PARAM F/U: HCPCS | Performed by: STUDENT IN AN ORGANIZED HEALTH CARE EDUCATION/TRAINING PROGRAM

## 2022-09-12 PROCEDURE — 1123F ACP DISCUSS/DSCN MKR DOCD: CPT | Performed by: STUDENT IN AN ORGANIZED HEALTH CARE EDUCATION/TRAINING PROGRAM

## 2022-09-12 ASSESSMENT — ENCOUNTER SYMPTOMS: SHORTNESS OF BREATH: 1

## 2022-09-12 NOTE — PROGRESS NOTES
[address] VISIT      Patient Name: Yinka Vieyra Record Number:  [id number]  Primary Care Physician:  [address]    ChiefComplaint     Chief Complaint   Patient presents with    Ear Problem     Ringing of ears, review hearing eval, vertigo,        History of Present Illness     Abigail Gallego is an 81 y.o. female previously seen for right eustachian tube dysfunction, mixed hearing loss of right ear. Right PE tube placed under local anesthesia at last appointment on 7/11/2022. Interval History:   Pain and fullness in her right ear is improved after her ear tube placement. Gets dizzy when she lies down and gets up for the last 1 month. Chronic imbalance issues for years. Will occasionally occur when lying in bed and turning over. + room spinning sensation will last up to 1 minute. No recent head trauma. No hx of BPPV.      Past Medical History     Past Medical History:   Diagnosis Date    Anaphylactic reaction 10/22/2015    Went to ER    Carcinoma in situ of breast     Esophageal reflux     hysterectomy     Lymphedema of left upper extremity 02/2016    Osteoarthrosis, unspecified whether generalized or localized, unspecified site     Other and unspecified hyperlipidemia     Other extrapyramidal disease and abnormal movement disorder     Unspecified essential hypertension     Varicose veins of lower extremities with other complications        Past Surgical History     Past Surgical History:   Procedure Laterality Date    COLONOSCOPY  1/8/02    colonoscopy screening (1/8/2002): mattie Mcginnis 10yr     HYSTERECTOMY (CERVIX STATUS UNKNOWN)      KNEE ARTHROSCOPY Right     rt knee, menisectomy     LUNG BIOPSY  12/21/2015    Cancer    MAMMO IMPLANT DIGITAL DIAG BI  11/2011    mammogram screening 11/2011    MASTECTOMY Bilateral 2016       Family History     Family History   Problem Relation Age of Onset    Heart Disease Mother Hypertension Mother     Heart Disease Father     Hypertension Father     Cancer Father     Diabetes Maternal Uncle        Social History     Social History     Tobacco Use    Smoking status: Former     Types: Cigarettes     Quit date: 1995     Years since quittin.7    Smokeless tobacco: Never   Substance Use Topics    Alcohol use: No     Alcohol/week: 0.0 standard drinks    Drug use: No        Allergies     Allergies   Allergen Reactions    Polidocanol Anaphylaxis    Lescol Other (See Comments)     Myalgias. Lipitor Other (See Comments)     Muscle pain. Novocain [Procaine] Other (See Comments)     sweaty       Medications     Current Outpatient Medications   Medication Sig Dispense Refill    gabapentin (NEURONTIN) 300 MG capsule Take 300 mg by mouth nightly. metoprolol tartrate (LOPRESSOR) 50 MG tablet Take 1 tablet by mouth 2 times daily Take one tablet the night prior and one hour prior testing 2 tablet 0    pramipexole (MIRAPEX) 1 MG tablet Take 1 mg by mouth nightly      hydrochlorothiazide (HYDRODIURIL) 25 MG tablet Take 25 mg by mouth daily      ezetimibe (ZETIA) 10 MG tablet TAKE 1 TABLET BY MOUTH EVERY DAY 90 tablet 0    Multiple Vitamins-Minerals (CENTRUM SILVER ULTRA WOMENS) TABS Take  by mouth. Take  by mouth daily. omeprazole (PRILOSEC) 20 MG capsule Take 20 mg by mouth daily. TRIAMTERENE PO Take 37.5 mg by mouth daily (Patient not taking: Reported on 2022)      EPINEPHrine (EPIPEN) 0.3 MG/0.3ML SOAJ injection Use as directed for allergic reaction (Patient not taking: Reported on 2022)       No current facility-administered medications for this visit.        Review of Systems     REVIEW OF SYSTEM: See HPI above    PhysicalExam     Vitals:    22 1052   BP: (!) 144/80   Pulse: 66   Temp: 97.6 °F (36.4 °C)   TempSrc: Temporal       PHYSICAL EXAM  BP (!) 144/80   Pulse 66   Temp 97.6 °F (36.4 °C) (Temporal)     GENERAL: No acute distress, alert and oriented. EYES: EOMI, Anti-icteric. No resting or gaze nystagmus. NOSE: On anterior rhinoscopy there is no epistaxis, nasal mucosa moist and normal appearing, no purulent drainage. EARS: Normal external appearance; on portable otomicroscopy:     -Ad: External auditory canal without stenosis, tympanic membrane with PE tube present in posterior inferior quadrant, patent. No otorrhea.     -As: External auditory canal without stenosis, tympanic membrane clear, no middle ear effusions or retractions  FACE: HB 1/6 bilaterally, symmetric appearing, sensation equal bilaterally  ORAL CAVITY: No masses or lesions visualized or palpated, uvula is midline, moist mucous membranes, no oropharyngeal masses or oropharyngeal obstruction  NECK: Normal range of motion, no thyromegaly, trachea is midline, no palpable lymphadenopathy or neck masses, no crepitus  CHEST: Normal respiratory effort, breathing comfortably, no retractions  SKIN: No rashes, normal appearing skin, no evidence of skin lesions/tumors  NEURO: Cranial Nerves 2, 3, 4, 5, 6, 7, 11, 12 grossly intact bilaterally   [place] Hallpike: + torsional nystagmus noted upon right-sided testing. No torsional nystagmus noted upon left-sided testing. I have performed a head and neck physical exam personally or was physically present during the key or critical portions of the service. Procedure     Procedure: Canalith Repositioning Therapy (CPT 65684)     Pre op:  BPPV, right-sided  Post op: Same  Procedure:  Epley maneuver for right-sided BPPV  Physician: Nick Farrell DO     Description of Procedure: The patient's head was rotated to the right. Supporting her neck and her back the patient was briskly moved from a seated to supine position, placing her neck in extension while maintaining rotation. Fatigable torsional geotropic nystagmus was noted as well as subjective vertigo.   After approximately 1 minute, after allowing for nystagmus and vertigo to subside, the patient's head was then rotated towards the opposite side while maintaining neck support. Another minute was allowed to elapse and then the patient's body was placed in the left recumbent position. Another minute was allowed to elapse and the patient was then placed in the seated position slowly with their legs overlying the examination table, maintaining this position for the next 2 minutes. The patient tolerated the procedure well without complication. Post-procedural instructions were given. Data/Imaging Review     Comprehensive audiological evaluation performed in the office today by Jt GUTIERREZ was independently reviewed by myself which demonstrates:    As: Moderate primarily sensorineural hearing loss with isolated 15 dB air-bone gap at 500 Hz    Ad: Moderate to moderately severe mixed hearing loss    % right, % left. Type B tympanogram right. Type A tympanogram left. Assessment and Plan     1. BPPV (benign paroxysmal positional vertigo), right  -Gonzalez-Hallpike positive for right BPPV. Epley maneuver performed in the office today. Post procedure instructions and home Epley maneuver instructions provided to the patient. If she does not improve fully with home therapy I have placed a referral to physical therapy for vestibular rehab. - [facility]    2. Mixed conductive and sensorineural hearing loss of right ear with restricted hearing of left ear  -Continue wearing hearing aids  - Recommend yearly audiogram for surveillance    3. Dysfunction of right eustachian tube  -Right PE tube in place, in proper position and patent. Patient doing better after PE tube placement. Follow-Up     Return if symptoms worsen or fail to improve. Dr[facility]   Department of Otolaryngology/Head and Neck Surgery  9/12/22    Medical Decision Making:   The following items were considered in medical decision making:  Independent review of images  Review / order clinical lab tests  Review / order radiology tests  Decision to obtain old records      This note was generated completely or in part utilizing Dragon dictation speech recognition software. Occasionally, words are mistranscribed and despite editing, the text may contain inaccuracies due to incorrect word recognition. If further clarification is needed please contact the office at [phone].

## 2022-09-12 NOTE — PROGRESS NOTES
[facility]  Division of Audiology/Otolaryngology    9/12/2022     Patient name: Rayray Hinojosa  Primary Care Physician: [address]   Medical Record Number: [id number]     Rayray Hinojosa   [birth year], 81 y.o. female   [id number]       Referring Provider: Jasbir De Leon DO  Referral Type: In an order in [address] [place] [address]    Reason for Visit: Evaluation of the cause of disorders of hearing, tinnitus, or balance. ADULT AUDIOLOGIC EVALUATION                    Rayray Hinojosa is a 81 y.o. female seen today, 9/12/2022 , for same-day audiologic evaluation. Patient was seen by referring provider Jasbir De Leon DO following today's evaluation. AUDIOLOGIC AND OTHER PERTINENT MEDICAL HISTORY:      Rayray Hinojosa presents with history of right chronic serous otitis media with effusion and associated mixed hearing loss. Notes occasional vertigo. Prior tympanogram on 7/11/2022 confirmed chronic middle ear pressure issue. IMPRESSIONS:      Today's results are consistent with sensorineural hearing loss of left ear, excellent word recognition, and normal middle ear function. Asymmetrical mixed hearing loss of right ear,  excellent word recognition, with myringotomy tube in place. Patient to follow medical recommendations per  Jasbir De Leon DO.     ASSESSMENT AND FINDINGS:     Otoscopy revealed: Visible tympanic, left                                   Myringotomy tube, right     Reliability: Good   Transducer: Inserts    RIGHT EAR:  Hearing Sensitivity: Moderate to severe asymmetrical mixed hearing loss; 25-35 dB HL air-bone gaps  Speech Recognition Threshold: 65 dB HL  Word Recognition: Excellent (%), based on NU-6   Tympanometry: Flat, no peak pressure or compliance, Type B tympanogram, with typical ear canal volume, myringotomy tube in situ.   Acoustic Reflexes: Ipsilateral: Absent     LEFT EAR:  Hearing Sensitivity: Moderate sensorineural hearing loss  Speech Recognition Threshold: 55 dB HL  Word Recognition: Excellent (%), based on NU-6   Tympanometry: Mild peak pressure and normal compliance, Type A tympanogram, consistent with normal middle ear function. Acoustic Reflexes: Ipsilateral: Present at elevated sensation levels and Absent at isolated frequencies. Prior 6/2022 audiogram    COUNSELING:      Reviewed purpose of testing completed today, general auditory system function, and results obtained today. The following items are recommended based on patient report and results from today's appointment:   - Continue medical follow-up with Amanda Mcmullen DO.   - Retest hearing as medically indicated and/or sooner if a change in hearing is noted. Chart CC'd to: Amanda Mcmullen DO      Degree of   Hearing Sensitivity dB Range   Within Normal Limits (WNL) 0 - 20   Mild 20 - 40   Moderate 40 - 55   Moderately-Severe 55 - 70   Severe 70 - 90   Profound 90 +        RECOMMENDATIONS:        Amanda Mcmullen DO to medically advise.     uHbert Schmidt  Audiologist    Electronically signed by Hubert Schmidt on 9/12/22 at 10:09 AM.

## 2022-09-20 ENCOUNTER — HOSPITAL ENCOUNTER (OUTPATIENT)
Dept: PHYSICAL THERAPY | Age: 82
Setting detail: THERAPIES SERIES
Discharge: HOME | End: 2022-09-20
Payer: MEDICARE

## 2022-09-20 PROCEDURE — 97112 NEUROMUSCULAR REEDUCATION: CPT

## 2022-09-20 PROCEDURE — 97161 PT EVAL LOW COMPLEX 20 MIN: CPT

## 2022-09-20 NOTE — PLAN OF CARE
[address] [address] [place] , [address]  Phone: [phone]   Fax: [phone]     Physical Therapy Certification    Dear Oscar Anne DO  ,    We had the pleasure of evaluating the following patient for physical therapy services at [address] [place] [address]. A summary of our findings can be found in the initial assessment below. This includes our plan of care. If you have any questions or concerns regarding these findings, please do not hesitate to contact me at the office phone number checked above. Thank you for the referral.       Physician Signature:_______________________________Date:__________________  By signing above (or electronic signature), therapist's plan is approved by physician      Patient: Ricardo Hidalgo   : [birth year]   MRN: [id number]  Referring Physician: Oscar Anne DO        Evaluation Date: 2022      Medical Diagnosis Information:  BPPV (benign paroxysmal positional vertigo), right [H81.11]   PT diagnosis: imbalance, marginal R vestibular hypofunction                                      Insurance information: PT Insurance Information: MEDICARE     Precautions/ Contra-indications: NA  Latex Allergy:  [x]NO      []YES  Preferred Language for Healthcare:   [x]English       []Other:    C-SSRS Triggered by Intake questionnaire (Past 2 wk assessment ):   [x] No, Questionnaire did not trigger screening.   [] Yes, Patient intake triggered C-SSRS Screening     [] Completed, no further action required. [] Completed, PCP notified via Epic    SUBJECTIVE: Patient stated chief complaint: she had positional vertigo, and finally followed up with Yamileth Garcia MD - who performed Epley for R ear. Her BPPV has resolved since. Patient reports some fear about walking on uneven surfaces, but has been pleased that her positional vertigo is gone.  She is agreeable to performing vestibular exercises on her own, and due to her snowbird status over the winter - she would opt to follow up with PT next year to re-assess balance and any vestibular dysfunction. Relevant Medical History: OA, hyperlipidemia, HTN     Functional Outcome Measures: (at least 1 at evaluation)  FOTO = 57; risk-adjusted = 54    Pain Scale: 0/10     Type: []Constant   []Intermittent  []Radiating []Localized []other:    Dizziness Scale: 0/10    Description of symptoms: [x] Vertigo [x]  Off-balance [] Lightheadedness    Symptoms are getting:  [x] Better [] Worse  [] Same      [] Episodic    Description of Spells: [] Constant [] Spontaneous [] Motion Induced [x] Induced by position changes [] Other:    Length of time spells occur: [x] Seconds [] Minutes  [] Hours [] Days [] Other:     Date of onset: few weeks prior    Setting in which symptoms first occurred: home; in bed    What increases/provokes symptoms? Lying down, rolling in bed; walking on uneven ground    What decreases/eases symptoms? Epley maneuver    Hearing impairments:  [x] Yes  [] No  [x] Other: R hearing loss    Hearing changes since onset:  [] Yes  [x] No  [] Other:    Visual changes since onset: [] Yes  [x] No  [] Other:    Recent falls:    [] Yes  [x] No     Comments:      History of migraines/HA:  [] Yes  [x] No    Comments:    Previous treatments: Epley with MD    Job requirements/work status: retired    Occupation/School: retired    Living Status/Prior Level of Function: Prior to this injury / incident, patient was independent with ADLs and IADLs, she remains independent - but fearful of walking on uneven ground    OBJECTIVE:     Palpation: 0 TTP    Functional Mobility/Transfers: indep.     Posture: forward head    Inspection: slightly less R cervical rotation observed during vestibular challenges    Numbness/Tingling: NA    Gait: (include devices/WB status): imbalance when walking with head turns; not with pitching; gait otherwise unremarkable    Cervical AROM    Cervical Flexion WFL    Cervical Extension WFL    Cervical SB WFL L WFL R    Cervical rotation WFL L WFL R      Oculomotor/Vestibular Examination & Special Tests:     Coordination:  Rapid alternating movements: [x] Normal [] Dysdiadochokinesia   Finger to Nose:   [x] Normal [] Dysmetric  Heel to Shin:    [x] Normal [] Ataxic    Balance & Postural Control Tests:  Clinical Test of Sensory Interaction for Balance (CTSIB) performed in Romberg stance  CONDITION TIME STRATEGY SWAY    Eyes open, firm surface 10 NA NA    Eyes closed, firm surface 10 ankle min    Eyes open, foam surface 10 ankle Min    Eyes closed, foam surface 10 Ankle/hip mod     Balance:  CONDITION TIME STRATEGY SWAY   Narrowed AVRIL 10  NA   Tandem R 10 Ankle/hip min   Tandem L 10 Ankle/hip min   SLS L NT     SLS R NT       Oculomotor/Vestibular Examination     Spontaneous nystagmus:  [] Left  [] Right [x] Absent    Gaze-Evoked nystagmus with fixation present:   Primary [] Present [x] Absent   Right  [] Present [x] Absent   Left  [] Present [x] Absent    Smooth Pursuit (H):  [x] Normal [] Abnormal Comments:    Smooth Pursuit (V):  [x] Normal [] Abnormal Comments:     Saccades (H):  [x] Normal [] Abnormal Comments:     Saccades (V):   [x] Normal [] Abnormal Comments:     Convergence:  [] Normal [] Abnormal Comments:     Head Thrust Test:  [] Normal [] Abnormal  Comments:     VOR Cancellation (H): [] Normal [] Abnormal Comments:    VOR Cancellation (V): [] Normal [] Abnormal Comments:     VOR (V):   [x] Normal [] Abnormal Comments: slight decrease in patient willingness/tendency to rotate head R    VOR (H):   [x] Normal [] Abnormal Comments:    Positional Testing:  R Hallpike-[place] maneuver:    Nystagmus:  [] Yes  [x] No  [] Duration:       [] Direction:    Vertigo:  [] Yes  [x] No  [] Duration:     L Hallpike-Gonzalez maneuver:   Nystagmus:  [] Yes  [x] No  [] Duration:       [] Direction:    Vertigo:  [] Yes  [x] No  [] Duration:     Supine roll head right:   Nystagmus:  [] Yes  [x] No  [] Duration:       [] Direction:    Vertigo:  [] Yes  [x] No  [] Duration:     Supine roll head left:   Nystagmus:  [] Yes  [x] No  [] Duration:       [] Direction:    Vertigo:  [] Yes  [x] No  [] Duration:       [x] Patient history, allergies, meds reviewed. Medical chart reviewed. See intake form. Review of Systems (ROS):  [x]Performed Review of systems (Integumentary, Cardiopulmonary, Neurological) by intake and observation. Intake form has been scanned into medical record. Patient has been instructed to contact their primary care physician regarding ROS issues if not already being addressed at this time.       Co-morbidities/Complexities (which will affect course of rehabilitation):   []None           Arthritic conditions   []Rheumatoid arthritis (M05.9)  [x]Osteoarthritis (M19.91)   Cardiovascular conditions   [x]Hypertension (I10)  [x]Hyperlipidemia (E78.5)  []Angina pectoris (I20)  []Atherosclerosis (I70)   Musculoskeletal conditions   []Disc pathology   []Congenital spine pathologies   []Prior surgical intervention  []Osteoporosis (M81.8)  []Osteopenia (M85.8)   Endocrine conditions   []Hypothyroid (E03.9)  []Hyperthyroid Gastrointestinal conditions   []Constipation (E75.20)   Metabolic conditions   []Morbid obesity (E66.01)  []Diabetes type 1(E10.65) or 2 (E11.65)   []Neuropathy (G60.9)     Pulmonary conditions   []Asthma (J45)  []Coughing   []COPD (J44.9)   Psychological Disorders  []Anxiety (F41.9)  []Depression (F32.9)   []Other:   []Other:           Barriers to/and or personal factors that will affect rehab potential:              []Age  []Sex    []Smoker              []Motivation/Lack of Motivation                        []Co-Morbidities              []Cognitive Function, education/learning barriers              []Environmental, home barriers              []profession/work barriers  []past PT/medical experience  []other:  Justification:     Falls Risk Assessment (30 days):   [x] Falls Risk assessed; no intervention required. [] Falls Risk assessed; Patient requires intervention due to being higher risk   TUG score (>12s at risk):     [] Falls education provided, including       ASSESSMENT: The patient is an 81year old female who presents with resolved BPPV and marginal R vestibular hypofunction. The patient will be prescribed a home-program for vestibular habituation, and will perform this, in addition to maintaining daily walking (indoors/outdoors) to manage any lingering imbalance and hypofunction. The patient was instructed to follow up with referring ENT or PT in case of new or unexpected symptoms.     Functional Impairments:  Problem List/Functional Limitations:   [x] BPPV (resolved)    [x] Right [] Posterior Canal [] Canalithiasis    [] Left  [] Horizontal Canal [] Cupulolithiasis   [] Decreased Gaze Stabilization    [] Increased Motion Sensitivity   [x] Unilateral Vestibular Hypofunction [] Bilateral Vestibular Hypofunction   [] Gait Instability    [] Decreased tolerance for ADLs    [] Decreased functional strength   [] Reduced Balance/Proprioceptive control   [x] Reduced ability to hear/focus   [] Noted cervical/thoracic/GHJ joint hypomobility   [] Noted cervical/thoracic/GHJ joint hypermobility   [] Decreased cervical/UE functional ROM   [] Noted Headache pain aggravated by neck movements with/without dizziness   [] Abnormal reflexes/sensation/myotomal/dermatomal deficits   [] Decreased DCF control or ability to hold head up   [] Decreased RC/scapular/core strength and neuromuscular control     Functional Activity Limitations (from functional questionnaire and intake)   []Reduced ability to tolerate prolonged functional positions   []Reduced ability or difficulty with changes of positions or transfers between positions  []Reduced ability to transfer in/out of bed or rolling in bed   []Reduced ability or tolerance with driving, reading and/or computer work   []Reduced ability to perform lifting, reaching, carrying tasks   []Reduced ability to forward bend   []Reduced ability to ambulate prolonged functional periods/distances/surfaces   []Reduced ability to ascend/descend stairs  []Reduced ability to concentrate/focus  [x]Reduced ability to turn/pitch head rapidly  []Reduced ability to self-correct for losses of balance [x]other:  ambulate over uneven surfaces     Participation Restrictions   [x]Reduced participation in self-care activities (exercise)   []Reduced participation in home management activities   []Reduced participation in work activities   []Reduced participation in social activities   []Reduced participation in sport/recreational activities    Classification:   [x]Signs/symptoms consistent with BPPV (benign paroxysmal positional vertigo)   - RESOLVED   [x]Signs/symptoms consistent with unilateral peripheral vestibulopathy (i.e., vestibular neuritis, labyrinthitis, acoustic neuroma, hypofunction)   []Signs/symptoms consistent with central vestibulopathy  []Signs/symptoms consistent with migraine-related vestibulopathy  []Signs/symptoms consistent with Meniere's disease / post-traumatic hydrops  []Signs/symptoms consistent with perilymphatic fistula   []Signs/symptoms consistent with cervicogenic dizziness  []Signs/symptoms consistent with gait instability   []Signs/symptoms consistent with motion sensitivity    []signs/symptoms consistent with neck pain with mobility deficits     []signs/symptoms consistent with neck pain with movement coordinated impairments    []signs/symptoms consistent with neck pain with radiating pain    []signs/symptoms consistent with neck pain with headaches (cervicogenic)    []Signs/symptoms consistent with nerve root involvement including myotome & dermatome dysfunction   []sign/symptoms consistent with facet dysfunction of cervical and thoracic spine    []signs/symptoms consistent suggesting central cord compression/UMN syndromes   []signs/symptoms consistent with discogenic cervical pain   []signs/symptoms consistent with rib dysfunction   []signs/symptoms consistent with postural dysfunction   []signs/symptoms consistent with shoulder pathology    []signs/symptoms consistent with post-surgical status including decreased ROM, strength and function. []signs/symptoms consistent with pathology which may benefit from Dry Needling  []signs/symptoms which may limit the use of advanced manual therapy techniques: (Elevated CV risk profile, recent trauma, intolerance to end range positions, prior TIA, visual issues, UE neurological compromise)   []other:      Prognosis/Rehab Potential:      [x]Excellent   []Good    []Fair   []Poor    Tolerance of evaluation/treatment:    [x]Excellent   []Good    []Fair   []Poor     Physical Therapy Evaluation Complexity Justification  [x] A history of present problem with:  [x] no personal factors and/or comorbidities that impact the plan of care;  []1-2 personal factors and/or comorbidities that impact the plan of care  []3 personal factors and/or comorbidities that impact the plan of care  [x] An examination of body systems using standardized tests and measures addressing any of the following: body structures and functions (impairments), activity limitations, and/or participation restrictions;:  [x] a total of 1-2 or more elements   [] a total of 3 or more elements   [] a total of 4 or more elements   [x] A clinical presentation with:  [x] stable and/or uncomplicated characteristics   [] evolving clinical presentation with changing characteristics  [] unstable and unpredictable characteristics;   [x] Clinical decision making of [x] low, [] moderate, [] high complexity using standardized patient assessment instrument and/or measurable assessment of functional outcome.     [x] EVAL (LOW) 01226 (typically 15 minutes face-to-face)  [] EVAL (MOD) 62304 (typically 30 minutes face-to-face)  [] EVAL (HIGH) 39911 (typically 45 minutes face-to-face)  [] RE-EVAL PLAN:   Today's Treatment:    [x] See flowsheet   [] Patient treated with canalith repositioning maneuver   [] Education materials provided on BPPV/Vestibular Dysfunction/Habituation   [] Precautions provided and patient to follow precautions for next 24 hours in regards to BPPV management   [x] Written home exercise instructions   [] Other:    Frequency/Duration:  1 days per week for 1 Weeks:  Interventions:  [x]  Therapeutic exercise including: strength training, ROM, for LEs, cervical spine & UEs  [x]  NMR activation and proprioception for BLEs, vestibular training/habituation, balance, coordination  [x]  Manual therapy as indicated via: canalith repositioning maneuvers, Dry Needling/IASTM, STM, PROM, Gr I-IV mobilizations, manipulation. [x]  Modalities as needed that may include: thermal agents, E-stim, Biofeedback, US, iontophoresis as indicated  [x]  Gait training  [x]  Patient education on BPPV/vestibular function, balance, postural re-education, activity modification, progression of HEP. HEP instruction: The following exercises were performed and added to the patient's home exercise program. (Seated VOR H, 2ft, 6-10ft). Additionally, the patient was educated on appropriate frequency, intensity and duration. Handout provided. GOALS:  Patient stated goal: to abolish positional vertigo  [] Progressing: [x] Met: [] Not Met: [] Adjusted    Therapist goals for Patient:   Short Term Goals: To be achieved in: 1 visit  1. Independent in HEP and progression per patient tolerance, in order to improve vestibular function. [] Progressing: [x] Met: [] Not Met: [] Adjusted  2. Patient will have a decrease in dizziness/imbalance/symptoms to 0/10 with transfers and bending to facilitate independence with mobility.   [] Progressing: [x] Met: [] Not Met: [] Adjusted    Electronically signed by:  Mag Welch PT, DPT, OMT-C

## 2022-09-20 NOTE — FLOWSHEET NOTE
educated on diagnosis, prognosis and expectations for rehab  -all patient questions were answered    Home Exercise Program:  9/20 - The following exercises were performed and added to the patient's home exercise program. (Seated VOR H, 2ft, 6-10ft). Additionally, the patient was educated on appropriate frequency, intensity and duration. Handout provided. Therapeutic Exercise and NMR EXR  [] (27280) Provided verbal/tactile cueing for activities related to strengthening, flexibility, endurance, ROM for improvements in  [] LE / Lumbar: LE, proximal hip, and core control with self care, mobility, lifting, ambulation. [] UE / Cervical: cervical, postural, scapular, scapulothoracic and UE control with self care, reaching, carrying, lifting, house/yardwork, driving, computer work. [x] (27698) Provided verbal/tactile cueing for activities related to improving balance, coordination, kinesthetic sense, posture, motor skill, proprioception to assist with   [] LE / lumbar: LE, proximal hip, and core control in self care, mobility, lifting, ambulation and eccentric single leg control. [x] UE / cervical: cervical, scapular, scapulothoracic and UE control with self care, reaching, carrying, lifting, house/yardwork, driving, computer work.   [] (71777) Therapist is in constant attendance of 2 or more patients providing skilled therapy interventions, but not providing any significant amount of measurable one-on-one time to either patient, for improvements in  [] LE / lumbar: LE, proximal hip, and core control in self care, mobility, lifting, ambulation and eccentric single leg control. [] UE / cervical: cervical, scapular, scapulothoracic and UE control with self care, reaching, carrying, lifting, house/yardwork, driving, computer work.      NMR and Therapeutic Activities:    [x] (80991 or 10882) Provided verbal/tactile cueing for activities related to improving balance, coordination, kinesthetic sense, posture, motor skill, proprioception and motor activation to allow for proper function of   [] LE: / Lumbar core, proximal hip and LE with self care and ADLs  [] UE / Cervical: cervical, postural, scapular, scapulothoracic and UE control with self care, carrying, lifting, driving, computer work.   [] (69201) Gait Re-education- Provided training and instruction to the patient for proper LE, core and proximal hip recruitment and positioning and eccentric body weight control with ambulation re-education including up and down stairs     Home Management Training / Self Care:  [] (73216) Provided self-care/home management training related to activities of daily living and compensatory training, and/or use of adaptive equipment for improvement with: ADLs and compensatory training, meal preparation, safety procedures and instruction in use of adaptive equipment, including bathing, grooming, dressing, personal hygiene, basic household cleaning and chores.      Home Exercise Program:    [] (88651) Reviewed/Progressed HEP activities related to strengthening, flexibility, endurance, ROM of   [] LE / Lumbar: core, proximal hip and LE for functional self-care, mobility, lifting and ambulation/stair navigation   [] UE / Cervical: cervical, postural, scapular, scapulothoracic and UE control with self care, reaching, carrying, lifting, house/yardwork, driving, computer work  [x] (74524)Reviewed/Progressed HEP activities related to improving balance, coordination, kinesthetic sense, posture, motor skill, proprioception of   [] LE: core, proximal hip and LE for self care, mobility, lifting, and ambulation/stair navigation    [x] UE / Cervical: cervical, postural,  scapular, scapulothoracic and UE control with self care, reaching, carrying, lifting, house/yardwork, driving, computer work    Manual Treatments:  PROM / STM / Oscillations-Mobs:  G-I, II, III, IV (PA's, Inf., Post.)  [] (69511) Provided manual therapy to mobilize LE, proximal hip and/or LS spine soft tissue/joints for the purpose of modulating pain, promoting relaxation,  increasing ROM, reducing/eliminating soft tissue swelling/inflammation/restriction, improving soft tissue extensibility and allowing for proper ROM for normal function with   [] LE / lumbar: self care, mobility, lifting and ambulation. [] UE / Cervical: self care, reaching, carrying, lifting, house/yardwork, driving, computer work. Modalities:  [] (79138) Vasopneumatic compression: Utilized vasopneumatic compression to decrease edema / swelling for the purpose of improving mobility and quad tone / recruitment which will allow for increased overall function including but not limited to self-care, transfers, ambulation, and ascending / descending stairs. Charges:  Timed Code Treatment Minutes: 12   Total Treatment Minutes: 45     [x] EVAL - LOW (08557)   [] EVAL - MOD (35867)  [] EVAL - HIGH (99461)  [] RE-EVAL (14295)  [] ZV(46442) x       [] Ionto  [x] NMR (97192) x 1       [] Vaso  [] Manual (83105) x       [] Ultrasound  [] TA x        [] Mech Traction (36407)  [] Aquatic Therapy x     [] ES (un) (72833):   [] Home Management Training x  [] ES(attended) (50029)   [] Dry Needling 1-2 muscles (73939):  [] Dry Needling 3+ muscles (095835)  [] Group:      [] Other:     GOALS:  Patient stated goal: to abolish positional vertigo  [] Progressing: [x] Met: [] Not Met: [] Adjusted    Therapist goals for Patient:   Short Term Goals: To be achieved in: 1 visit  1. Independent in HEP and progression per patient tolerance, in order to improve vestibular function. [] Progressing: [x] Met: [] Not Met: [] Adjusted  2. Patient will have a decrease in dizziness/imbalance/symptoms to 0/10 with transfers and bending to facilitate independence with mobility.   [] Progressing: [x] Met: [] Not Met: [] Adjusted    Overall Progression Towards Functional goals/ Treatment Progress Update:  [] Patient is progressing as expected towards functional goals listed. [] Progression is slowed due to complexities/Impairments listed. [] Progression has been slowed due to co-morbidities. [x] Plan just implemented, too soon to assess goals progression <30days   [] Goals require adjustment due to lack of progress  [] Patient is not progressing as expected and requires additional follow up with physician  [] Other    Persisting Functional Limitations/Impairments:  []Sleeping []Sitting               []Standing []Transfers        [x]Walking []Kneeling               []Stairs []Squatting / bending   []ADLs []Reaching  []Lifting  []Housework  []Driving []Job related tasks  []Sports/Recreation [x]Other: walking over uneven ground      ASSESSMENT:  See eval  Treatment/Activity Tolerance:  [x] Patient able to complete tx [] Patient limited by fatigue  [] Patient limited by pain  [] Patient limited by other medical complications  [] Other:     Prognosis: [x] Good [] Fair  [] Poor    Patient Requires Follow-up: [x] Yes  [] No    Plan for next treatment session: follow up with ENT or PT in case of new or unexpected symptoms    PLAN: See eval. PT 1x / week for 1 weeks. [] Continue per plan of care [] Alter current plan (see comments)  [] Plan of care initiated [] Hold pending MD visit [x] Discharge    Electronically signed by: Kamila Joyner, PT, DPT, OMT-C    Note: If patient does not return for scheduled/ recommended follow up visits, this note will serve as a discharge from care along with most recent update on progress.

## 2022-09-28 ENCOUNTER — HOSPITAL ENCOUNTER (OUTPATIENT)
Dept: CT IMAGING | Age: 82
Discharge: HOME | End: 2022-09-28
Payer: MEDICARE

## 2022-09-28 VITALS
WEIGHT: 172 LBS | BODY MASS INDEX: 32.47 KG/M2 | HEART RATE: 58 BPM | RESPIRATION RATE: 16 BRPM | TEMPERATURE: 96 F | OXYGEN SATURATION: 96 % | HEIGHT: 61 IN

## 2022-09-28 DIAGNOSIS — I47.1 PAT (PAROXYSMAL ATRIAL TACHYCARDIA) (HCC): ICD-10-CM

## 2022-09-28 DIAGNOSIS — E78.2 MIXED HYPERLIPIDEMIA: ICD-10-CM

## 2022-09-28 DIAGNOSIS — I10 ESSENTIAL HYPERTENSION: ICD-10-CM

## 2022-09-28 DIAGNOSIS — R07.9 CHEST PAIN, UNSPECIFIED TYPE: ICD-10-CM

## 2022-09-28 LAB
CREAT SERPL-MCNC: 0.7 MG/DL (ref 0.6–1.2)
GFR AFRICAN AMERICAN: >60
GFR NON-AFRICAN AMERICAN: >60

## 2022-09-28 PROCEDURE — 6370000000 HC RX 637 (ALT 250 FOR IP): Performed by: RADIOLOGY

## 2022-09-28 PROCEDURE — 36415 COLL VENOUS BLD VENIPUNCTURE: CPT

## 2022-09-28 PROCEDURE — 82565 ASSAY OF CREATININE: CPT

## 2022-09-28 PROCEDURE — 75574 CT ANGIO HRT W/3D IMAGE: CPT

## 2022-09-28 PROCEDURE — 6360000004 HC RX CONTRAST MEDICATION: Performed by: INTERNAL MEDICINE

## 2022-09-28 RX ORDER — NITROGLYCERIN 0.4 MG/1
0.4 TABLET SUBLINGUAL ONCE
Status: COMPLETED | OUTPATIENT
Start: 2022-09-28 | End: 2022-09-28

## 2022-09-28 RX ADMIN — NITROGLYCERIN 0.4 MG: 0.4 TABLET, ORALLY DISINTEGRATING SUBLINGUAL at 08:44

## 2022-09-28 RX ADMIN — IOPAMIDOL 90 ML: 755 INJECTION, SOLUTION INTRAVENOUS at 08:54

## 2022-10-05 NOTE — RESULT ENCOUNTER NOTE
Please let her know  she has  some minmal plaque, will discuss statin at next visit  Recommend follow up with GI for possible inflammation of esophagous

## 2022-10-06 ENCOUNTER — TELEPHONE (OUTPATIENT)
Dept: CARDIOLOGY CLINIC | Age: 82
End: 2022-10-06

## 2022-10-06 NOTE — TELEPHONE ENCOUNTER
Please let her know  she has  some minmal plaque, will discuss statin at next visit   Recommend follow up with GI for possible inflammation of esophagous       LMTCB on voicemail.

## 2022-11-14 ENCOUNTER — TELEPHONE (OUTPATIENT)
Dept: CARDIOLOGY CLINIC | Age: 82
End: 2022-11-14

## 2022-11-14 NOTE — TELEPHONE ENCOUNTER
pt called cx appt has the flu, needs to cancel today appt 11/14 and wants to RS to 1st av[place] any location in the next week or two. Pls provide new date and time the pt can get scheduled with DKW.

## 2022-11-15 NOTE — TELEPHONE ENCOUNTER
Called patient about the message below and he and her  verbalized understanding and took the appointment.

## 2022-12-01 ASSESSMENT — ENCOUNTER SYMPTOMS: SHORTNESS OF BREATH: 1

## 2022-12-01 NOTE — PROGRESS NOTES
[address]  Referring: Dr. Nathan Dubose CONSULT/CHIEF COMPLAINT/HPI     Reason for visit/ Chief complaint  Follow up  Chest pain, dizziness   HPI Anny Stover is a 81 y.o. seen as a 2 year follow up for dizziness, chest pressure. She has a history of PAT, hypertension, hyperlipidemia. She is [place], has hearing aids that she does not always wear. She has RA, does not treat it because she does not think she has it \"bad enough\"  She had breast cancer in , treated with mastectomy, and 36 tx of radiation. She had a hysterectomy for noncancerous polyps. She has never been pregnant, was on fertility tx for years. She occasionally has a glass of sweet white wine. Was a smoker during college, quit  one year later. She has had multiple orthopedic surgeries. Father had CABG at age 48( he was a a smoker). Mother  of cancer. Siblings all have RA. .    In the interval since the last visit, she had a CTA to evaluate chest pain, that showed plaque like luminal irregularities throughout the coronary arteries. She was referred to GI for possible esophageal dilation. She was also treated for vertigo, with resolution of symptoms. Today she states she is doing well. She will be leaving today to go to [place] for the winter. When [place] is in [place], she goes to exercise classes with friends ( bettina) and golfs. She walks on the treadmill 15 minutes twice a day, on \"medium speed\" No chest pain, shortness of breath palpitations or dizziness. Wears support hose bilaterally to control edema. Sleeps at two hour intervals, admits to snoring. Sleeps propped up. 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      Patient is adherent with medications and is tolerating them well without side effects     HISTORY/ALLERGIES/ROS     MedHx:  has a past medical history of Anaphylactic reaction, Carcinoma in situ of breast, Esophageal reflux, hysterectomy, Lymphedema of left upper extremity, Osteoarthrosis, unspecified whether generalized or localized, unspecified site, Other and unspecified hyperlipidemia, Other extrapyramidal disease and abnormal movement disorder, Unspecified essential hypertension, and Varicose veins of lower extremities with other complications. SurgHx:  has a past surgical history that includes Knee arthroscopy (Right); Hysterectomy; Mammography digital diagnostic bilateral (11/2011); Colonoscopy (1/8/02); Mastectomy (Bilateral, 2016); and Lung biopsy (12/21/2015). SocHx:  reports that she quit smoking about 27 years ago. Her smoking use included cigarettes. She has never used smokeless tobacco. She reports that she does not drink alcohol and does not use drugs. FamHx: Premature cad in dad  Allergies: Polidocanol, Lescol, Lipitor, and Novocain [procaine]   ROS:   Review of Systems   Respiratory:  Positive for shortness of breath. Cardiovascular:  Negative for chest pain. Neurological:  Negative for dizziness. All other systems reviewed and are negative. MEDICATIONS      Prior to Admission medications    Medication Sig Start Date End Date Taking? Authorizing Provider   triamterene-hydroCHLOROthiazide (MAXZIDE-25) 37.5-25 MG per tablet Take 1 tablet by mouth daily   Yes Historical Provider, MD   gabapentin (NEURONTIN) 300 MG capsule Take 300 mg by mouth nightly. Yes Historical Provider, MD   metoprolol tartrate (LOPRESSOR) 50 MG tablet Take 1 tablet by mouth 2 times daily Take one tablet the night prior and one hour prior testing 9/9/22  Yes Elliot Muro,    pramipexole (MIRAPEX) 1 MG tablet Take 1 mg by mouth nightly 8/27/20  Yes Historical Provider, MD   ezetimibe (ZETIA) 10 MG tablet TAKE 1 TABLET BY MOUTH EVERY DAY 5/20/19  Yes Chandler Roach MD   Multiple Vitamins-Minerals (CENTRUM SILVER ULTRA WOMENS) TABS Take  by mouth. Take  by mouth daily. Yes Historical Provider, MD   omeprazole (PRILOSEC) 20 MG capsule Take 20 mg by mouth daily.    Yes Historical Provider, MD       PHYSICAL EXAM        Vitals:    12/02/22 1423   BP: 118/60   Pulse: 77   SpO2: 98%    Weight: 178 lb 11.2 oz (81.1 kg)     Gen Alert, cooperative, no distress Heart  Regular rate and rhythm, no murmur   Head Normocephalic, atraumatic, no abnormalities Abd  Soft, NT, +BS, no mass, no OM   Eyes PERRLA, conj/corn clear Ext  Ext nl, AT, no C/C, no edema   Nose Nares normal, no drain age, Non-tender Pulse 2+ and symmetric   Throat Lips, mucosa, tongue normal Skin Color/text/turg nl, no rash/lesions   Neck S/S, TM, NT, no bruit Psych Nl mood and affect   Lung CTA-B, unlabored, no DTP NEURO +dixhalepike on right   Ch wall NT, no deform       LABS and Imaging     Relevant and available CV data reviewed  Echo/MRI: 2019--normal left ventricle size, wall thickness and systolic function with an  estimated ejection fraction of 60%. No regional wall motion abnormalities  are seen. E/e\"= 13. Grade I diastolic dysfunction with normal LV filling pressures. Trivial mitral regurgitation. Mild tricuspid regurgitation. PASP 20mmHg. IVC size is normal (<2.1cm) and collapses > 50% with respiration consistent  with normal RA pressure (3mmHg Cath: Holter:7/2019  PAT associated with lightheadedness  EKG personally interpreted:  NSR  9/9/22-  Stress:2019 stress echo- normal  Moderate Risk  Moderate Complexity/Medical Decision Making  Outside/Care everywhere records Reviewed  Labs Reviewed  Prior Imaging, ekg, cath, echo reviewed when available  Medications reviewed  Old Notes reviewed  ASSESSMENT AND PLAN      Coronary calcifications  -      new problem  -      family history , previous breast radiation, RA, hyperlipidemia  -      coronary CTA- minimal plaque, possible esophageal inflammation  -      noncardiac chest pain  Plan  -     start crestor    2    Essential Hypertension  -     9/16/22   bun 18 creat 0.81  -     118/60-       Plan  -     lopressor 50 mg bid  -     maxide 37.5/25 mg daily    3.   Mixed

## 2022-12-02 ENCOUNTER — OFFICE VISIT (OUTPATIENT)
Dept: CARDIOLOGY CLINIC | Age: 82
End: 2022-12-02
Payer: MEDICARE

## 2022-12-02 VITALS
BODY MASS INDEX: 33.74 KG/M2 | SYSTOLIC BLOOD PRESSURE: 118 MMHG | OXYGEN SATURATION: 98 % | HEART RATE: 77 BPM | WEIGHT: 178.7 LBS | DIASTOLIC BLOOD PRESSURE: 60 MMHG | HEIGHT: 61 IN

## 2022-12-02 DIAGNOSIS — I25.10 CORONARY ARTERY CALCIFICATION: Primary | ICD-10-CM

## 2022-12-02 DIAGNOSIS — I25.84 CORONARY ARTERY CALCIFICATION: Primary | ICD-10-CM

## 2022-12-02 DIAGNOSIS — K44.9 HIATAL HERNIA: ICD-10-CM

## 2022-12-02 DIAGNOSIS — I10 ESSENTIAL HYPERTENSION: ICD-10-CM

## 2022-12-02 DIAGNOSIS — E78.2 MIXED HYPERLIPIDEMIA: ICD-10-CM

## 2022-12-02 PROCEDURE — G8484 FLU IMMUNIZE NO ADMIN: HCPCS | Performed by: INTERNAL MEDICINE

## 2022-12-02 PROCEDURE — 1123F ACP DISCUSS/DSCN MKR DOCD: CPT | Performed by: INTERNAL MEDICINE

## 2022-12-02 PROCEDURE — G8427 DOCREV CUR MEDS BY ELIG CLIN: HCPCS | Performed by: INTERNAL MEDICINE

## 2022-12-02 PROCEDURE — 99214 OFFICE O/P EST MOD 30 MIN: CPT | Performed by: INTERNAL MEDICINE

## 2022-12-02 PROCEDURE — G8417 CALC BMI ABV UP PARAM F/U: HCPCS | Performed by: INTERNAL MEDICINE

## 2022-12-02 PROCEDURE — 1036F TOBACCO NON-USER: CPT | Performed by: INTERNAL MEDICINE

## 2022-12-02 PROCEDURE — 3078F DIAST BP <80 MM HG: CPT | Performed by: INTERNAL MEDICINE

## 2022-12-02 PROCEDURE — 1090F PRES/ABSN URINE INCON ASSESS: CPT | Performed by: INTERNAL MEDICINE

## 2022-12-02 PROCEDURE — 3074F SYST BP LT 130 MM HG: CPT | Performed by: INTERNAL MEDICINE

## 2022-12-02 PROCEDURE — G8399 PT W/DXA RESULTS DOCUMENT: HCPCS | Performed by: INTERNAL MEDICINE

## 2022-12-02 RX ORDER — TRIAMTERENE AND HYDROCHLOROTHIAZIDE 37.5; 25 MG/1; MG/1
1 TABLET ORAL DAILY
COMMUNITY

## 2022-12-02 RX ORDER — ROSUVASTATIN CALCIUM 5 MG/1
5 TABLET, COATED ORAL DAILY
Qty: 90 TABLET | Refills: 1 | Status: SHIPPED | OUTPATIENT
Start: 2022-12-02

## 2022-12-02 NOTE — LETTER
[address] Cardiology [facility] [address]  Phone: [phone]  Fax: [address], DO    December 14, 2022     [address]  [address] [address]    Patient: Tracy Drake   MR Number: [id number]   YOB: 1940   Date of Visit: 12/2/2022       Dear [address]:    Thank you for referring Poly Diane to me for evaluation/treatment. Below are the relevant portions of my assessment and plan of care. If you have questions, please do not hesitate to call me. I look forward to following Mateo Herzog along with you.     Sincerely,      Claudio Rucker, DO

## 2022-12-02 NOTE — PATIENT INSTRUCTIONS
Recommend light weights   Recommend 150 minutes of exercise weekly  Decrease sugar and sweets in diet  Start crestor 5 mg daily

## 2023-05-22 ASSESSMENT — ENCOUNTER SYMPTOMS: SHORTNESS OF BREATH: 1

## 2023-05-22 NOTE — PROGRESS NOTES
[address]  Referring: Dr. Isac Patel CONSULT/CHIEF COMPLAINT/HPI     Reason for visit/ Chief complaint  Follow up  Chest pain, dizziness   HPI Minna Jaffe is a 81 y.o. seen as a 2 year follow up for dizziness, chest pressure. She has a history of PAT, hypertension, hyperlipidemia. She is [place], has hearing aids that she does not always wear. She has RA, does not treat it because she does not think she has it \"bad enough\"  She had breast cancer in , treated with mastectomy, and 36 tx of radiation. She had a hysterectomy for noncancerous polyps. She has never been pregnant, was on fertility tx for years. She occasionally has a glass of sweet white wine. Was a smoker during college, quit  one year later. She has had multiple orthopedic surgeries. Father had CABG at age 48 (he was a a smoker). Mother  of cancer. Siblings all have RA. In 2022, she had a CTA to evaluate chest pain, that showed plaque like luminal irregularities throughout the coronary arteries. She was referred to GI for possible esophageal dilation. She was also treated for vertigo, with resolution of symptoms. Today, she states she is doing well from a heart perspective. Does not \"exercise\" but reports she is very active in the summer months because she frequently is taking care of her garden. States she does not walk routinely because she has balance problems \"because she is 83.\" States she has not tried anything to help. Does no water therapy while she and her  are in [facility] and feels it is beneficial. Open to exploring water therapy options here. States she only sleep 2 hours at a time, \"which is normal for her\" every since she retired (20 years ago). She will get up and read before going back to sleep. Her  states she snores. States she is not over fatigued. 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 They spend 6 months out of the year down in [place].

## 2023-05-23 ENCOUNTER — OFFICE VISIT (OUTPATIENT)
Dept: CARDIOLOGY CLINIC | Age: 83
End: 2023-05-23
Payer: MEDICARE

## 2023-05-23 VITALS
WEIGHT: 174.3 LBS | BODY MASS INDEX: 29.76 KG/M2 | SYSTOLIC BLOOD PRESSURE: 118 MMHG | HEIGHT: 64 IN | OXYGEN SATURATION: 98 % | DIASTOLIC BLOOD PRESSURE: 64 MMHG | HEART RATE: 69 BPM

## 2023-05-23 DIAGNOSIS — I10 ESSENTIAL HYPERTENSION: ICD-10-CM

## 2023-05-23 DIAGNOSIS — E78.2 MIXED HYPERLIPIDEMIA: ICD-10-CM

## 2023-05-23 DIAGNOSIS — I47.1 PAT (PAROXYSMAL ATRIAL TACHYCARDIA) (HCC): ICD-10-CM

## 2023-05-23 DIAGNOSIS — I25.10 CORONARY ARTERY CALCIFICATION: Primary | ICD-10-CM

## 2023-05-23 DIAGNOSIS — I25.84 CORONARY ARTERY CALCIFICATION: Primary | ICD-10-CM

## 2023-05-23 DIAGNOSIS — R06.83 SNORING: ICD-10-CM

## 2023-05-23 PROCEDURE — G8427 DOCREV CUR MEDS BY ELIG CLIN: HCPCS | Performed by: INTERNAL MEDICINE

## 2023-05-23 PROCEDURE — 1090F PRES/ABSN URINE INCON ASSESS: CPT | Performed by: INTERNAL MEDICINE

## 2023-05-23 PROCEDURE — G8417 CALC BMI ABV UP PARAM F/U: HCPCS | Performed by: INTERNAL MEDICINE

## 2023-05-23 PROCEDURE — 3078F DIAST BP <80 MM HG: CPT | Performed by: INTERNAL MEDICINE

## 2023-05-23 PROCEDURE — 1123F ACP DISCUSS/DSCN MKR DOCD: CPT | Performed by: INTERNAL MEDICINE

## 2023-05-23 PROCEDURE — 99214 OFFICE O/P EST MOD 30 MIN: CPT | Performed by: INTERNAL MEDICINE

## 2023-05-23 PROCEDURE — 1036F TOBACCO NON-USER: CPT | Performed by: INTERNAL MEDICINE

## 2023-05-23 PROCEDURE — 3074F SYST BP LT 130 MM HG: CPT | Performed by: INTERNAL MEDICINE

## 2023-05-23 PROCEDURE — G8399 PT W/DXA RESULTS DOCUMENT: HCPCS | Performed by: INTERNAL MEDICINE

## 2023-05-23 RX ORDER — DULOXETIN HYDROCHLORIDE 60 MG/1
CAPSULE, DELAYED RELEASE ORAL
COMMUNITY
Start: 2023-04-12

## 2023-05-23 NOTE — PATIENT INSTRUCTIONS
No medication changes    Call your PCP if abrasion on your left leg does not improve or if it starts to drain    Consider getting sleep study  Referral placed  [address] [facility], [address] [facility], [address] [facility],[address]  [place], [address] [place] [address]  Phone: [phone]

## 2023-05-25 ENCOUNTER — HOSPITAL ENCOUNTER (OUTPATIENT)
Dept: GENERAL RADIOLOGY | Age: 83
Discharge: HOME | End: 2023-05-25
Payer: MEDICARE

## 2023-05-25 DIAGNOSIS — Z78.0 POSTMENOPAUSAL ESTROGEN DEFICIENCY: ICD-10-CM

## 2023-05-25 PROCEDURE — 77080 DXA BONE DENSITY AXIAL: CPT

## 2023-08-08 ENCOUNTER — OFFICE VISIT (OUTPATIENT)
Dept: ENT CLINIC | Age: 83
End: 2023-08-08
Payer: MEDICARE

## 2023-08-08 VITALS
BODY MASS INDEX: 30.05 KG/M2 | HEART RATE: 71 BPM | SYSTOLIC BLOOD PRESSURE: 138 MMHG | DIASTOLIC BLOOD PRESSURE: 81 MMHG | WEIGHT: 176 LBS | HEIGHT: 64 IN | TEMPERATURE: 97.5 F | OXYGEN SATURATION: 97 %

## 2023-08-08 DIAGNOSIS — H93.13 TINNITUS OF BOTH EARS: ICD-10-CM

## 2023-08-08 DIAGNOSIS — H81.11 BPPV (BENIGN PAROXYSMAL POSITIONAL VERTIGO), RIGHT: ICD-10-CM

## 2023-08-08 DIAGNOSIS — H90.A31 MIXED CONDUCTIVE AND SENSORINEURAL HEARING LOSS OF RIGHT EAR WITH RESTRICTED HEARING OF LEFT EAR: Primary | ICD-10-CM

## 2023-08-08 DIAGNOSIS — H61.21 IMPACTED CERUMEN OF RIGHT EAR: ICD-10-CM

## 2023-08-08 PROCEDURE — 99213 OFFICE O/P EST LOW 20 MIN: CPT | Performed by: STUDENT IN AN ORGANIZED HEALTH CARE EDUCATION/TRAINING PROGRAM

## 2023-08-08 PROCEDURE — G8417 CALC BMI ABV UP PARAM F/U: HCPCS | Performed by: STUDENT IN AN ORGANIZED HEALTH CARE EDUCATION/TRAINING PROGRAM

## 2023-08-08 PROCEDURE — 1090F PRES/ABSN URINE INCON ASSESS: CPT | Performed by: STUDENT IN AN ORGANIZED HEALTH CARE EDUCATION/TRAINING PROGRAM

## 2023-08-08 PROCEDURE — 3075F SYST BP GE 130 - 139MM HG: CPT | Performed by: STUDENT IN AN ORGANIZED HEALTH CARE EDUCATION/TRAINING PROGRAM

## 2023-08-08 PROCEDURE — G8427 DOCREV CUR MEDS BY ELIG CLIN: HCPCS | Performed by: STUDENT IN AN ORGANIZED HEALTH CARE EDUCATION/TRAINING PROGRAM

## 2023-08-08 PROCEDURE — 3079F DIAST BP 80-89 MM HG: CPT | Performed by: STUDENT IN AN ORGANIZED HEALTH CARE EDUCATION/TRAINING PROGRAM

## 2023-08-08 PROCEDURE — 1036F TOBACCO NON-USER: CPT | Performed by: STUDENT IN AN ORGANIZED HEALTH CARE EDUCATION/TRAINING PROGRAM

## 2023-08-08 PROCEDURE — 1123F ACP DISCUSS/DSCN MKR DOCD: CPT | Performed by: STUDENT IN AN ORGANIZED HEALTH CARE EDUCATION/TRAINING PROGRAM

## 2023-08-08 PROCEDURE — G8399 PT W/DXA RESULTS DOCUMENT: HCPCS | Performed by: STUDENT IN AN ORGANIZED HEALTH CARE EDUCATION/TRAINING PROGRAM

## 2023-08-08 PROCEDURE — 69210 REMOVE IMPACTED EAR WAX UNI: CPT | Performed by: STUDENT IN AN ORGANIZED HEALTH CARE EDUCATION/TRAINING PROGRAM

## 2023-08-08 NOTE — PROGRESS NOTES
[place] [address] [address]      [facility] Record Number:  [id number]  Primary Care Physician:  Gabbi    ChiefComplaint     Chief Complaint   Patient presents with    Ear Problem     Ear cleaning, hearing getting worse, discuss meniere        History of Present Illness     Oneida Silvestre is an 81 y.o. female previously seen for right eustachian tube dysfunction, mixed hearing loss of right ear, right BPPV. Right PE tube placed under local anesthesia at last appointment on 7/11/2022. Interval History:   Feels like she is gradually losing hearing on the right for the past couple of months. Has been wearing hearing aids for approx last 3 years. With chronic bilateral constant tinnitus, unchanged. No otorrhea. Occasional mild otalgia on the right. Still with dizziness that occurs only when lying down. Will last only seconds.      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

## 2023-08-22 ENCOUNTER — HOSPITAL ENCOUNTER (OUTPATIENT)
Dept: PHYSICAL THERAPY | Age: 83
Setting detail: THERAPIES SERIES
Discharge: HOME | End: 2023-08-22
Payer: MEDICARE

## 2023-08-22 PROCEDURE — 97162 PT EVAL MOD COMPLEX 30 MIN: CPT

## 2023-08-22 PROCEDURE — 97530 THERAPEUTIC ACTIVITIES: CPT

## 2023-08-22 NOTE — PLAN OF CARE
[] Adjusted    Long Term Goals: To be achieved in: at discharge  1. Decrease DHI functional outcome score from 30 to 20  to assist with reaching prior level of function. [] Progressing: [] Met: [] Not Met: [] Adjusted  2. Patient will demonstrate staggered stance with eyes closed x15 sec for improved balance with decreased vision such as a night or in a dimly lit room such as a movie theater. [] Progressing: [] Met: [] Not Met: [] Adjusted  3.. Patient will return to functional activities including golfing without increased symptoms or restriction. [] Progressing: [] Met: [] Not Met: [] Adjusted  4. Pt will be able to bend down to put things in/take things out of the oven without feeling dizzy so pt can return to cooking without LOB. [] Progressing: [] Met: [] Not Met: [] Adjusted     Electronically signed by:  Renee Rodríguez, PT, MPT [id number]    Note: If patient does not return for scheduled/recommended follow up visits, this note will serve as a discharge from care along with the most recent update on progress.

## 2023-08-23 NOTE — FLOWSHEET NOTE
[address] [facility]   [facility], [address]  Phone: [phone]   Fax:     [phone]      Physical Therapy Treatment Note/ Progress Report:   Date:  2023    Patient Name:  Karen Newell    :    MRN: [id number]    Medical/Treatment Diagnosis Information:  Medical Diagnosis: BPPV (benign paroxysmal positional vertigo), right [H81.11]  PT diagnosis:       + R bello hallpike indicating R BPPV, tinitis, hearing loss, decreased balance with reduced vision, decreased balance with narrowed base of support limiting balance with functional mobility such as walking, tranfers, stair amb, driving              Insurance information:  [facility] [place] [facility], $0 copay, plan, visits BMN, no auth required    Physician Information:  Magalie Dinero DO  Plan of care signed (Y/N): []  Yes [x]  No     Date of Patient follow up with Physician:      Progress Report: []  Yes  [x]  No     Date Range for reporting period:  Beginnin2023  Ending:     Progress report due (10 Rx/or 30 days whichever is less):      Recertification due (POC duration/ or 90 days whichever is less):  23  Visit # Insurance Allowable Auth required? Date Range   -12 [place] []  Yes [x]  No      Latex Allergy:  [x]NO      []YES  Preferred Language for Healthcare:   [x]English       []other:    Functional Outcomes Measure:     Test: [facility]  Date Assessed Score   23 30         Pain level:  0/10     SUBJECTIVE:  Pt reports having intermittent dizziness for a few years. She recently went to the ENT who performed the [facility] maneuver per pt. She felt better for a while. Pt reports some hearing lose and tinnitis. Pt is living in an 1829's house and is renovating it. Pt enjoys cooking and golfing.        OBJECTIVE: +R bello hallpike    Pertinent Medical History:  OA, osteoporosis, h/o breast cancer s/p B mastectomy - in remission, HTN, SOB, h/o

## 2023-08-25 ENCOUNTER — HOSPITAL ENCOUNTER (OUTPATIENT)
Dept: PHYSICAL THERAPY | Age: 83
Setting detail: THERAPIES SERIES
Discharge: HOME | End: 2023-08-25
Payer: MEDICARE

## 2023-08-25 PROCEDURE — 95992 CANALITH REPOSITIONING PROC: CPT

## 2023-08-25 PROCEDURE — 97530 THERAPEUTIC ACTIVITIES: CPT

## 2023-08-25 PROCEDURE — 97110 THERAPEUTIC EXERCISES: CPT

## 2023-08-25 NOTE — FLOWSHEET NOTE
to facilitate improvement in movement, function, balance and ADLs as indicated by Functional Deficits. [] Progressing: [] Met: [] Not Met: [] Adjusted     Long Term Goals: To be achieved in: at discharge  1. Decrease DHI functional outcome score from 30 to 20  to assist with reaching prior level of function. [] Progressing: [] Met: [] Not Met: [] Adjusted  2. Patient will demonstrate staggered stance with eyes closed x15 sec for improved balance with decreased vision such as a night or in a dimly lit room such as a movie theater. [] Progressing: [] Met: [] Not Met: [] Adjusted  3.. Patient will return to functional activities including golfing without increased symptoms or restriction. [] Progressing: [] Met: [] Not Met: [] Adjusted  4. Pt will be able to bend down to put things in/take things out of the oven without feeling dizzy so pt can return to cooking without LOB. [] Progressing: [] Met: [] Not Met: [] Adjusted            ASSESSMENT:  - R/L bello porter noted this date. Pt was able to fan therapeutic activities for balance with narrow AVRIL and decreased vision. Pt requried min A for balance when amb with head turns and nods with cognitive tasks. Pt cont to benefit from skilled PT services to address weak hip flex/core, reduced balance, impaired gait all limting functional mobility and ablility to demo full balance for activities such as driving, playing golf, cooking    Treatment/Activity Tolerance:  [x] Patient tolerated treatment well [] Patient limited by fatique  [] Patient limited by pain  [] Patient limited by other medical complications  [] Other:     Overall Progression Towards Functional goals/ Treatment Progress Update:  [] Patient is progressing as expected towards functional goals listed. [] Progression is slowed due to complexities/Impairments listed. [] Progression has been slowed due to co-morbidities.   [x] Plan just implemented, too soon to assess goals progression <30days   []

## 2023-09-01 ENCOUNTER — APPOINTMENT (OUTPATIENT)
Dept: PHYSICAL THERAPY | Age: 83
End: 2023-09-01
Payer: MEDICARE

## 2023-09-05 ENCOUNTER — HOSPITAL ENCOUNTER (OUTPATIENT)
Dept: PHYSICAL THERAPY | Age: 83
Setting detail: THERAPIES SERIES
Discharge: HOME | End: 2023-09-05
Payer: MEDICARE

## 2023-09-05 PROCEDURE — 97530 THERAPEUTIC ACTIVITIES: CPT

## 2023-09-05 PROCEDURE — 95992 CANALITH REPOSITIONING PROC: CPT

## 2023-09-05 PROCEDURE — 97110 THERAPEUTIC EXERCISES: CPT

## 2023-09-05 PROCEDURE — 97112 NEUROMUSCULAR REEDUCATION: CPT

## 2023-09-05 NOTE — FLOWSHEET NOTE
[address] [facility] Therapy  [address] [place] [address], Gail Magdy, [facility]  Phone: [phone]   Fax:     [phone]      Physical Therapy Treatment Note/ Progress Report:   Date:  2023    Patient Name:  Lalit Vieira    :    MRN: [id number]    Medical/Treatment Diagnosis Information:  Medical Diagnosis: BPPV (benign paroxysmal positional vertigo), right [H81.11]  PT diagnosis:       + R bello hallpike indicating R BPPV, tinitis, hearing loss, decreased balance with reduced vision, decreased balance with narrowed base of support limiting balance with functional mobility such as walking, tranfers, stair amb, driving              Insurance information:  [facility], $0 copay, 80/20plan, visits BMN, no auth required    Physician Information:  Vannesa Moy DO  Plan of care signed (Y/N): []  Yes [x]  No     Date of Patient follow up with Physician:      Progress Report: []  Yes  [x]  No     Date Range for reporting period:  Beginnin2023  Ending:     Progress report due (10 Rx/or 30 days whichever is less):      Recertification due (POC duration/ or 90 days whichever is less):  23  Visit # Insurance Allowable Auth required? Date Range   3 / 8-12 [place] []  Yes [x]  No      Latex Allergy:  [x]NO      []YES  Preferred Language for Healthcare:   [x]English       []other:    Functional Outcomes Measure:     Test: [facility]  Date Assessed Score   23 30         Pain level:  0/10     SUBJECTIVE:  Pt states she con't to get dizzy at home. She reports dizziness with sitting up and standing up.  felt good after [facility] maneuver on Tuesday. Pt was bent over and tying her shoes yesterday and had delayed onset of dizziness and fell forward. She was not injured because she was on carpet in her bedroom. HISTORY: Pt reports having intermittent dizziness for a few years. She recently went to the ENT who performed the [facility] maneuver per pt.

## 2023-09-08 ENCOUNTER — HOSPITAL ENCOUNTER (OUTPATIENT)
Dept: PHYSICAL THERAPY | Age: 83
Setting detail: THERAPIES SERIES
Discharge: HOME | End: 2023-09-08
Payer: MEDICARE

## 2023-09-08 PROCEDURE — 97110 THERAPEUTIC EXERCISES: CPT

## 2023-09-08 PROCEDURE — 97112 NEUROMUSCULAR REEDUCATION: CPT

## 2023-09-08 NOTE — FLOWSHEET NOTE
self-care, mobility, lifting and ambulation/stair navigation   [] (37783)Reviewed/Progressed HEP activities related to improving balance, coordination, kinesthetic sense, posture, motor skill, proprioception of core, proximal hip and LE for self care, mobility, lifting, and ambulation/stair navigation      Manual Treatments:  PROM / STM / Oscillations-Mobs:  G-I, II, III, IV (PA's, Inf., Post.)  [x] (97575) Provided manual therapy to mobilize LE, proximal hip and/or LS spine soft tissue/joints for the purpose of modulating pain, promoting relaxation,  increasing ROM, reducing/eliminating soft tissue swelling/inflammation/restriction, improving soft tissue extensibility and allowing for proper ROM for normal function with self care, mobility, lifting and ambulation. Approval Dates:  CPT Code Units Approved Units Used  Date Updated:                     Charges:  Timed Code Treatment Minutes: 50   Total Treatment Minutes: 50      [] EVAL (LOW) 46166 (typically 20 minutes face-to-face)  [] EVAL (MOD) 51282 (typically 30 minutes face-to-face)  [] EVAL (HIGH) 44541 (typically 45 minutes face-to-face)  [] RE-EVAL     [x] UL(20746) x 1    [] Dry needle 1 or 2 Muscles (83433)  [x] NMR (15006) x 2   [] Dry needle 3+ Muscles (38033)  [] Manual (15237) x     [] Ultrasound (43672) x  [] TA (20619) x  1  [] Mech Traction (70714)  [] ES(attended) (92229)     [] ES (un) (97703):   [] Vasopump (07472) [] Ionto (70464)   [] Other:CRT       GOALS:  Patient stated goal: steady balance`  [] Progressing: [] Met: [] Not Met: [] Adjusted     Therapist goals for Patient:   Short Term Goals: To be achieved in: 2 weeks  1. Independent in HEP and progression per patient tolerance, in order to prevent re-injury. [] Progressing: [] Met: [] Not Met: [] Adjusted  2.  Patient will have a decrease in dizziness/imbalance/symptoms with lying down to facilitate improvement in movement, function, balance and ADLs as indicated by Functional

## 2023-09-12 ENCOUNTER — HOSPITAL ENCOUNTER (OUTPATIENT)
Dept: PHYSICAL THERAPY | Age: 83
Setting detail: THERAPIES SERIES
Discharge: HOME | End: 2023-09-12
Payer: MEDICARE

## 2023-09-12 ENCOUNTER — PROCEDURE VISIT (OUTPATIENT)
Dept: AUDIOLOGY | Age: 83
End: 2023-09-12
Payer: MEDICARE

## 2023-09-12 ENCOUNTER — OFFICE VISIT (OUTPATIENT)
Dept: ENT CLINIC | Age: 83
End: 2023-09-12
Payer: MEDICARE

## 2023-09-12 VITALS
DIASTOLIC BLOOD PRESSURE: 84 MMHG | SYSTOLIC BLOOD PRESSURE: 126 MMHG | HEIGHT: 64 IN | TEMPERATURE: 97.5 F | HEART RATE: 63 BPM | BODY MASS INDEX: 29.71 KG/M2 | OXYGEN SATURATION: 97 % | WEIGHT: 174 LBS

## 2023-09-12 DIAGNOSIS — H90.A22 SENSORINEURAL HEARING LOSS (SNHL) OF LEFT EAR WITH RESTRICTED HEARING OF RIGHT EAR: Primary | ICD-10-CM

## 2023-09-12 DIAGNOSIS — H69.81 DYSFUNCTION OF RIGHT EUSTACHIAN TUBE: Primary | ICD-10-CM

## 2023-09-12 DIAGNOSIS — H90.A31 MIXED CONDUCTIVE AND SENSORINEURAL HEARING LOSS OF RIGHT EAR WITH RESTRICTED HEARING OF LEFT EAR: ICD-10-CM

## 2023-09-12 PROCEDURE — 92557 COMPREHENSIVE HEARING TEST: CPT | Performed by: AUDIOLOGIST

## 2023-09-12 PROCEDURE — 97112 NEUROMUSCULAR REEDUCATION: CPT

## 2023-09-12 PROCEDURE — 99999 PR OFFICE/OUTPT VISIT,PROCEDURE ONLY: CPT | Performed by: STUDENT IN AN ORGANIZED HEALTH CARE EDUCATION/TRAINING PROGRAM

## 2023-09-12 PROCEDURE — 69433 CREATE EARDRUM OPENING: CPT | Performed by: STUDENT IN AN ORGANIZED HEALTH CARE EDUCATION/TRAINING PROGRAM

## 2023-09-12 PROCEDURE — 97110 THERAPEUTIC EXERCISES: CPT

## 2023-09-12 PROCEDURE — 92567 TYMPANOMETRY: CPT | Performed by: AUDIOLOGIST

## 2023-09-12 RX ORDER — OFLOXACIN 3 MG/ML
SOLUTION/ DROPS OPHTHALMIC
Qty: 1 EACH | Refills: 0 | Status: SHIPPED | OUTPATIENT
Start: 2023-09-12

## 2023-09-12 NOTE — PROGRESS NOTES
[place] [address] [address]      Patient Name: [address] [place] [address] Record Number:  [id number]  Primary Care Physician:  Gabbi    ChiefComplaint     Chief Complaint   Patient presents with    Follow-up     F/u after hearing eval, has some pain in Rt ear       History of Present Illness     Hermelindo Ordoñez is an 81 y.o. female previously seen for right eustachian tube dysfunction, mixed hearing loss of right ear, right BPPV. Right PE tube placed under local anesthesia on 7/11/2022. Interval History:   Rigt sided hearing not as clear for the past few months. Minimal right ear pain that lasted a couple of weeks which occurred approximately 1 month ago. No otorrhea. He did think he had improvement after right ear tube placement but this is since worn off.     Past Medical History     Past Medical History:   Diagnosis Date    Anaphylactic reaction 10/22/2015    Went to ER    Carcinoma in situ of breast     Esophageal reflux     hysterectomy     Lymphedema of left upper extremity 02/2016    Osteoarthrosis, unspecified whether generalized or localized, unspecified site     Other and unspecified hyperlipidemia     Other extrapyramidal disease and abnormal movement disorder     Unspecified essential hypertension     Varicose veins of lower extremities with other complications        Past Surgical History     Past Surgical History:   Procedure Laterality Date    COLONOSCOPY  1/8/02    colonoscopy screening (1/8/2002): mattie Mcginnis 10yr     HYSTERECTOMY (CERVIX STATUS UNKNOWN)      KNEE ARTHROSCOPY Right     rt knee, menisectomy     LUNG BIOPSY  12/21/2015    Cancer    MAMMO IMPLANT DIGITAL DIAG BI  11/2011    mammogram screening 11/2011    MASTECTOMY Bilateral 2016       Family History     Family History   Problem Relation Age of Onset    Heart Disease Mother     Hypertension Mother     Heart Disease Father     Hypertension Father     Cancer

## 2023-09-12 NOTE — FLOWSHEET NOTE
is slowed due to complexities/Impairments listed. [] Progression has been slowed due to co-morbidities. [x] Plan just implemented, too soon to assess goals progression <30days   [] Goals require adjustment due to lack of progress  [] Patient is not progressing as expected and requires additional follow up with physician  [] Other    Prognosis for POC: [x] Good [] Fair  [] Poor    Patient requires continued skilled intervention: [x] Yes  [] No        PLAN: See pt 2x/wk x4-8 weeks for  vestibular rehab, canalith repositioning as needed, balance activities, core strengthening  [x] Continue per plan of care [] Alter current plan (see comments)  [] Plan of care initiated [] Hold pending MD visit [] Discharge    Electronically signed by: Vahid Smith, PT, MPT     Note: If patient does not return for scheduled/recommended follow up visits, this note will serve as a discharge from care along with the most recent update on progress.

## 2023-09-12 NOTE — PROGRESS NOTES
Rebeca Delcid   [birth year], 81 y.o. female   [id number]       Referring Provider: Josesito Alfredo DO  Referral Type: In an order in [address] [place] [address]    Reason for Visit: Re-evaluation of disorder as deemed medically necessary by referring provider    Talya Hernández is a 81 y.o. female seen today, 9/12/2023 , for an audiologic evaluation. Patient was seen by Josesito Alfredo DO following today's evaluation. AUDIOLOGIC AND OTHER PERTINENT MEDICAL HISTORY:      Rebeca Delcid noted intermittent otalgia in the right ear. She notes history of pressure equalizing (PE) tube in the right ear, bilateral tinnitus, and family history of hearing loss in mother. Patient reports known history of hearing loss with hearing aid use. Medical history is reportedly significant for radiation 12 years ago. No additional significant otologic or medical history was reported. Rebeca Delcid denied otalgia, aural fullness, otorrhea, dizziness, imbalance, history of falls, history of occupational/recreational noise exposure, history of head trauma, and family history of hearing loss. Date: 9/12/2023     IMPRESSIONS:      Today's results revealed a sensorineural hearing loss in the left ear and mixed conductive and sensorineural loss in the  right ear with excellent word recognition, bilaterally. Air-bone gaps > 10 dBHL noted from 500-1000 and at 4000Hz in the right ear. Tympanometry revealed flat, no peak pressure in the right ear and negative peak pressure in the left consistent with middle ear pathology, bilaterally. Follow medical recommendations of Josesito Alfredo DO.     ASSESSMENT AND FINDINGS:     Otoscopy revealed: Clear ear canals bilaterally    RIGHT EAR:  Hearing Sensitivity: Moderate sloping to moderately-severe sensorineural hearing loss. Speech Recognition Threshold: 55 dB HL  Word Recognition: Excellent 100%, based on NU-6 25-word list at 85 dBHL masked using recorded speech stimuli.

## 2023-09-15 ENCOUNTER — HOSPITAL ENCOUNTER (OUTPATIENT)
Dept: PHYSICAL THERAPY | Age: 83
Setting detail: THERAPIES SERIES
Discharge: HOME | End: 2023-09-15
Payer: MEDICARE

## 2023-09-15 PROCEDURE — 97110 THERAPEUTIC EXERCISES: CPT

## 2023-09-15 PROCEDURE — 95992 CANALITH REPOSITIONING PROC: CPT

## 2023-09-15 PROCEDURE — 97112 NEUROMUSCULAR REEDUCATION: CPT

## 2023-09-15 PROCEDURE — 97530 THERAPEUTIC ACTIVITIES: CPT

## 2023-09-19 ENCOUNTER — HOSPITAL ENCOUNTER (OUTPATIENT)
Dept: PHYSICAL THERAPY | Age: 83
Setting detail: THERAPIES SERIES
End: 2023-09-19
Payer: MEDICARE

## 2023-09-22 ENCOUNTER — APPOINTMENT (OUTPATIENT)
Dept: PHYSICAL THERAPY | Age: 83
End: 2023-09-22
Payer: MEDICARE

## 2023-09-26 ENCOUNTER — HOSPITAL ENCOUNTER (OUTPATIENT)
Dept: PHYSICAL THERAPY | Age: 83
Setting detail: THERAPIES SERIES
Discharge: HOME | End: 2023-09-26
Payer: MEDICARE

## 2023-09-26 PROCEDURE — 97110 THERAPEUTIC EXERCISES: CPT

## 2023-09-26 PROCEDURE — 97530 THERAPEUTIC ACTIVITIES: CPT

## 2023-09-26 PROCEDURE — 95992 CANALITH REPOSITIONING PROC: CPT

## 2023-09-26 PROCEDURE — 97112 NEUROMUSCULAR REEDUCATION: CPT

## 2023-09-26 NOTE — PROGRESS NOTES
arrived today. Pt has brain MRI scheduled for tomorrow. Pt cont to benefit from skilled PT services to address weak hip flex/core, reduced balance, impaired gait all limting functional mobility and ablility to demo full balance for activities such as driving, playing golf, cooking. Discussed cont PT x4more visits - pt will schedule on her way out today. Treatment/Activity Tolerance:  [x] Patient tolerated treatment well [] Patient limited by fatique  [] Patient limited by pain  [] Patient limited by other medical complications  [] Other:     Overall Progression Towards Functional goals/ Treatment Progress Update:  [] Patient is progressing as expected towards functional goals listed. [] Progression is slowed due to complexities/Impairments listed. [] Progression has been slowed due to co-morbidities. [x] Plan just implemented, too soon to assess goals progression <30days   [] Goals require adjustment due to lack of progress  [] Patient is not progressing as expected and requires additional follow up with physician  [] Other    Prognosis for POC: [x] Good [] Fair  [] Poor    Patient requires continued skilled intervention: [x] Yes  [] No        PLAN: See pt 2x/wk x4-8 weeks for  vestibular rehab, canalith repositioning as needed, balance activities, core strengthening  [x] Continue per plan of care [] Alter current plan (see comments)  [] Plan of care initiated [] Hold pending MD visit [] Discharge    Electronically signed by: Kory Mora PT, MPT     Note: If patient does not return for scheduled/recommended follow up visits, this note will serve as a discharge from care along with the most recent update on progress.

## 2023-10-06 ENCOUNTER — HOSPITAL ENCOUNTER (OUTPATIENT)
Dept: PHYSICAL THERAPY | Age: 83
Setting detail: THERAPIES SERIES
Discharge: HOME | End: 2023-10-06
Payer: MEDICARE

## 2023-10-06 PROCEDURE — 97110 THERAPEUTIC EXERCISES: CPT

## 2023-10-06 PROCEDURE — 97112 NEUROMUSCULAR REEDUCATION: CPT

## 2023-10-06 NOTE — PROGRESS NOTES
[address] [facility]  [facility], [facility], [address]  Phone: [phone]   Fax:     [phone]      Physical Therapy Treatment Note/ Progress Report:   Date:  10/06/2023    Patient Name:  Stanley Rodriguez    :  [birth year]  MRN: [id number]    Medical/Treatment Diagnosis Information:  Medical Diagnosis: BPPV (benign paroxysmal positional vertigo), right [H81.11]  PT diagnosis:       + R bello hallpike indicating R BPPV, tinitis, hearing loss, decreased balance with reduced vision, decreased balance with narrowed base of support limiting balance with functional mobility such as walking, tranfers, stair amb, driving              Insurance information:  [facility], $0 copay, 80/20plan, visits BMN, no auth required    Physician Information:  Candy Madison DO  Plan of care signed (Y/N): [x]  Yes []  No     Date of Patient follow up with Physician:      Progress Report: []  Yes  [x]  No     Date Range for reporting period:  Beginnin2023  Ending:     Progress report due (10 Rx/or 30 days whichever is less):  38    Recertification due (POC duration/ or 90 days whichever is less):  23  Visit # Insurance Allowable Auth required? Date Range   -12 [place] []  Yes [x]  No      Latex Allergy:  [x]NO      []YES  Preferred Language for Healthcare:   [x]English       []other:    Functional Outcomes Measure:     Test: [facility]  Date Assessed Score   23 30         Pain level:  0/10     SUBJECTIVE:  Pt states that she does not have any dizziness anymore. However, she is off balance when she walks, although she thinks this has improved since beginning PT. Pt had a brain MRI and will bring the written report as PT was unable to see it in the chart. HISTORY: Pt reports having intermittent dizziness for a few years. She recently went to the ENT who performed the [facility] maneuver per pt. She felt better for a while.   Pt reports some

## 2023-10-13 ENCOUNTER — HOSPITAL ENCOUNTER (OUTPATIENT)
Dept: PHYSICAL THERAPY | Age: 83
Setting detail: THERAPIES SERIES
Discharge: HOME | End: 2023-10-13
Payer: MEDICARE

## 2023-10-13 PROCEDURE — 97110 THERAPEUTIC EXERCISES: CPT

## 2023-10-13 PROCEDURE — 97112 NEUROMUSCULAR REEDUCATION: CPT

## 2023-10-13 NOTE — PROGRESS NOTES
[facility]  [address] [place] [address] Ave  Phone: [phone]   Fax:     [phone]      Physical Therapy Treatment Note/ Progress Report:   Date:  10/13/2023    Patient Name:  Charlie Rankin    :  [birth year]  MRN: [id number]    Medical/Treatment Diagnosis Information:  Medical Diagnosis: BPPV (benign paroxysmal positional vertigo), right [H81.11]  PT diagnosis:       + R bello hallpike indicating R BPPV, tinitis, hearing loss, decreased balance with reduced vision, decreased balance with narrowed base of support limiting balance with functional mobility such as walking, tranfers, stair amb, driving              Insurance information:  [facility], $0 copay, 80/20plan, visits BMN, no auth required    Physician Information:  Jeremi Sykes DO  Plan of care signed (Y/N): [x]  Yes []  No     Date of Patient follow up with Physician:      Progress Report: []  Yes  [x]  No     Date Range for reporting period:  Beginnin2023  Ending:     Progress report due (10 Rx/or 30 days whichever is less):      Recertification due (POC duration/ or 90 days whichever is less):  23  Visit # Insurance Allowable Auth required? Date Range   - [place] []  Yes [x]  No      Latex Allergy:  [x]NO      []YES  Preferred Language for Healthcare:   [x]English       []other:    Functional Outcomes Measure:     Test: [facility]  Date Assessed Score   23 30         Pain level:  0/10     SUBJECTIVE:  Pt states that she does not have any dizziness anymore. However, she still feels a little lightheaded. Pt brought in brain MRI and sleep study results. PT copied them so they can be scanned into the computer. Nothing alarming on MRI noted. HISTORY: Pt reports having intermittent dizziness for a few years. She recently went to the ENT who performed the [facility] maneuver per pt. She felt better for a while.   Pt reports some hearing loss

## 2023-10-20 ENCOUNTER — APPOINTMENT (OUTPATIENT)
Dept: PHYSICAL THERAPY | Age: 83
End: 2023-10-20
Payer: MEDICARE

## 2023-10-27 ENCOUNTER — HOSPITAL ENCOUNTER (OUTPATIENT)
Dept: PHYSICAL THERAPY | Age: 83
Setting detail: THERAPIES SERIES
Discharge: HOME | End: 2023-10-27
Payer: MEDICARE

## 2023-10-27 PROCEDURE — 97530 THERAPEUTIC ACTIVITIES: CPT

## 2023-10-27 PROCEDURE — 97110 THERAPEUTIC EXERCISES: CPT

## 2023-10-27 NOTE — DISCHARGE SUMMARY
follow up with physician  [] Other    Prognosis for POC: [x] Good [] Fair  [] Poor    Patient requires continued skilled intervention: [] Yes  [x] No        PLAN: See pt 2x/wk x4-8 weeks for  vestibular rehab, canalith repositioning as needed, balance activities, core strengthening  [] Continue per plan of care [] Alter current plan (see comments)  [] Plan of care initiated [] Hold pending MD visit [x] Discharge    Electronically signed by: Erin Martinez, PT, MPT     Note: If patient does not return for scheduled/recommended follow up visits, this note will serve as a discharge from care along with the most recent update on progress.

## 2023-11-10 RX ORDER — ROSUVASTATIN CALCIUM 5 MG/1
5 TABLET, COATED ORAL DAILY
Qty: 90 TABLET | Refills: 3 | Status: SHIPPED | OUTPATIENT
Start: 2023-11-10

## 2023-11-10 NOTE — TELEPHONE ENCOUNTER
Received refill request for Rosuvastatin from [facility]     Last ov:05/23/2023 DKW    Last labs:05/01/2023 Lipid    Last Refill:12/02/2022    Next appointment:None

## 2023-12-04 ENCOUNTER — TELEPHONE (OUTPATIENT)
Dept: ENT CLINIC | Age: 83
End: 2023-12-04

## 2023-12-04 NOTE — TELEPHONE ENCOUNTER
Pt is scheduled for first available on 12/26 -- she is experiencing pain in right ear (you placed a tube in 9/2023). She would like to know if she can be seen sooner. Please advise. Thank you.

## 2023-12-05 NOTE — TELEPHONE ENCOUNTER
I called and spoke with the patient. She was prescribed antibiotic yesterday. She states that she has head, right ear, and neck pain. Recommend that she finish out this antibiotic and if she is continuing to have right ear pain she should call the office and we can get her in to be seen.

## 2023-12-14 ENCOUNTER — OFFICE VISIT (OUTPATIENT)
Dept: ENT CLINIC | Age: 83
End: 2023-12-14
Payer: MEDICARE

## 2023-12-14 VITALS
SYSTOLIC BLOOD PRESSURE: 128 MMHG | HEART RATE: 71 BPM | WEIGHT: 180 LBS | DIASTOLIC BLOOD PRESSURE: 81 MMHG | HEIGHT: 64 IN | BODY MASS INDEX: 30.73 KG/M2 | OXYGEN SATURATION: 98 % | TEMPERATURE: 97.8 F

## 2023-12-14 DIAGNOSIS — H69.91 DYSFUNCTION OF RIGHT EUSTACHIAN TUBE: ICD-10-CM

## 2023-12-14 DIAGNOSIS — H92.01 ACUTE OTALGIA, RIGHT: Primary | ICD-10-CM

## 2023-12-14 PROCEDURE — G8484 FLU IMMUNIZE NO ADMIN: HCPCS | Performed by: STUDENT IN AN ORGANIZED HEALTH CARE EDUCATION/TRAINING PROGRAM

## 2023-12-14 PROCEDURE — 3074F SYST BP LT 130 MM HG: CPT | Performed by: STUDENT IN AN ORGANIZED HEALTH CARE EDUCATION/TRAINING PROGRAM

## 2023-12-14 PROCEDURE — 3079F DIAST BP 80-89 MM HG: CPT | Performed by: STUDENT IN AN ORGANIZED HEALTH CARE EDUCATION/TRAINING PROGRAM

## 2023-12-14 PROCEDURE — 99213 OFFICE O/P EST LOW 20 MIN: CPT | Performed by: STUDENT IN AN ORGANIZED HEALTH CARE EDUCATION/TRAINING PROGRAM

## 2023-12-14 PROCEDURE — G8427 DOCREV CUR MEDS BY ELIG CLIN: HCPCS | Performed by: STUDENT IN AN ORGANIZED HEALTH CARE EDUCATION/TRAINING PROGRAM

## 2023-12-14 PROCEDURE — 1090F PRES/ABSN URINE INCON ASSESS: CPT | Performed by: STUDENT IN AN ORGANIZED HEALTH CARE EDUCATION/TRAINING PROGRAM

## 2023-12-14 PROCEDURE — 1123F ACP DISCUSS/DSCN MKR DOCD: CPT | Performed by: STUDENT IN AN ORGANIZED HEALTH CARE EDUCATION/TRAINING PROGRAM

## 2023-12-14 PROCEDURE — G8417 CALC BMI ABV UP PARAM F/U: HCPCS | Performed by: STUDENT IN AN ORGANIZED HEALTH CARE EDUCATION/TRAINING PROGRAM

## 2023-12-14 PROCEDURE — G8399 PT W/DXA RESULTS DOCUMENT: HCPCS | Performed by: STUDENT IN AN ORGANIZED HEALTH CARE EDUCATION/TRAINING PROGRAM

## 2023-12-14 PROCEDURE — 1036F TOBACCO NON-USER: CPT | Performed by: STUDENT IN AN ORGANIZED HEALTH CARE EDUCATION/TRAINING PROGRAM

## 2023-12-14 RX ORDER — AMOXICILLIN AND CLAVULANATE POTASSIUM 875; 125 MG/1; MG/1
1 TABLET, FILM COATED ORAL 2 TIMES DAILY
COMMUNITY
Start: 2023-12-04 | End: 2023-12-14

## 2024-05-23 ASSESSMENT — ENCOUNTER SYMPTOMS: SHORTNESS OF BREATH: 1

## 2024-05-23 NOTE — PROGRESS NOTES
extrapyramidal disease and abnormal movement disorder, Unspecified essential hypertension, and Varicose veins of lower extremities with other complications.  SurgHx:  has a past surgical history that includes Knee arthroscopy (Right); Hysterectomy; Mammography digital diagnostic bilateral (11/2011); Colonoscopy (1/8/02); Mastectomy (Bilateral, 2016); and Lung biopsy (12/21/2015).   SocHx:  reports that she quit smoking about 29 years ago. Her smoking use included cigarettes. She has never used smokeless tobacco. She reports that she does not drink alcohol and does not use drugs.   FamHx: Premature cad in dad  Allergies: Polidocanol, Lescol, Lipitor, and Novocain [procaine]   ROS:   Review of Systems   Respiratory:  Positive for shortness of breath.    Cardiovascular:  Negative for chest pain.   Neurological:  Negative for dizziness.   All other systems reviewed and are negative.         MEDICATIONS      Prior to Admission medications    Medication Sig Start Date End Date Taking? Authorizing Provider   Cyanocobalamin (VITAMIN B 12 PO) Take by mouth   Yes Bryson Leblanc MD   rosuvastatin (CRESTOR) 5 MG tablet TAKE ONE TABLET BY MOUTH DAILY 11/10/23  Yes Ger Stephenson DO   DULoxetine (CYMBALTA) 60 MG extended release capsule  4/12/23  Yes Bryson Leblanc MD   CALCIUM PO Take by mouth   Yes Bryson Leblanc MD   ALPHA-LIPOIC ACID PO Take by mouth   Yes Bryson Leblanc MD   triamterene-hydroCHLOROthiazide (MAXZIDE-25) 37.5-25 MG per tablet Take 1 tablet by mouth daily   Yes Bryson Leblanc MD   pramipexole (MIRAPEX) 1 MG tablet Take 1 tablet by mouth nightly 8/27/20  Yes Bryson Leblanc MD   ezetimibe (ZETIA) 10 MG tablet TAKE 1 TABLET BY MOUTH EVERY DAY 5/20/19  Yes Binh Andrade MD   omeprazole (PRILOSEC) 20 MG capsule Take 1 capsule by mouth daily   Yes Bryson Leblanc MD       PHYSICAL EXAM        Vitals:    05/24/24 1019   BP: 126/60   Pulse: 62   SpO2: 99%

## 2024-05-23 NOTE — PATIENT INSTRUCTIONS
Increase Rosuvastatin dose to 10 mg daily - new prescription sent to the Pharmacy.  You can take 2 of the 5 mg tablets    Recommend 150 minutes of exercise per week     Follow up with Sleep Medicine to discuss treatment for sleep apnea    Limit the amount of red meat, butter in diet, fast/fried food and sweets    Echocardiogram soon     Follow up with Dr. Stephenson in

## 2024-05-24 ENCOUNTER — OFFICE VISIT (OUTPATIENT)
Dept: CARDIOLOGY CLINIC | Age: 84
End: 2024-05-24
Payer: MEDICARE

## 2024-05-24 VITALS
OXYGEN SATURATION: 99 % | HEART RATE: 62 BPM | BODY MASS INDEX: 31.1 KG/M2 | DIASTOLIC BLOOD PRESSURE: 60 MMHG | HEIGHT: 64 IN | SYSTOLIC BLOOD PRESSURE: 126 MMHG | WEIGHT: 182.2 LBS

## 2024-05-24 DIAGNOSIS — R06.02 SHORTNESS OF BREATH: Primary | ICD-10-CM

## 2024-05-24 DIAGNOSIS — I10 ESSENTIAL HYPERTENSION: ICD-10-CM

## 2024-05-24 DIAGNOSIS — I25.84 CORONARY ARTERY CALCIFICATION: ICD-10-CM

## 2024-05-24 DIAGNOSIS — I25.10 CORONARY ARTERY CALCIFICATION: ICD-10-CM

## 2024-05-24 DIAGNOSIS — G47.33 OSA (OBSTRUCTIVE SLEEP APNEA): ICD-10-CM

## 2024-05-24 DIAGNOSIS — E78.2 MIXED HYPERLIPIDEMIA: ICD-10-CM

## 2024-05-24 PROCEDURE — G8427 DOCREV CUR MEDS BY ELIG CLIN: HCPCS | Performed by: INTERNAL MEDICINE

## 2024-05-24 PROCEDURE — 1036F TOBACCO NON-USER: CPT | Performed by: INTERNAL MEDICINE

## 2024-05-24 PROCEDURE — 3074F SYST BP LT 130 MM HG: CPT | Performed by: INTERNAL MEDICINE

## 2024-05-24 PROCEDURE — 1090F PRES/ABSN URINE INCON ASSESS: CPT | Performed by: INTERNAL MEDICINE

## 2024-05-24 PROCEDURE — G8399 PT W/DXA RESULTS DOCUMENT: HCPCS | Performed by: INTERNAL MEDICINE

## 2024-05-24 PROCEDURE — G8417 CALC BMI ABV UP PARAM F/U: HCPCS | Performed by: INTERNAL MEDICINE

## 2024-05-24 PROCEDURE — 1123F ACP DISCUSS/DSCN MKR DOCD: CPT | Performed by: INTERNAL MEDICINE

## 2024-05-24 PROCEDURE — 99214 OFFICE O/P EST MOD 30 MIN: CPT | Performed by: INTERNAL MEDICINE

## 2024-05-24 PROCEDURE — 3078F DIAST BP <80 MM HG: CPT | Performed by: INTERNAL MEDICINE

## 2024-05-24 RX ORDER — ROSUVASTATIN CALCIUM 10 MG/1
10 TABLET, COATED ORAL DAILY
Qty: 90 TABLET | Refills: 3 | Status: SHIPPED | OUTPATIENT
Start: 2024-05-24

## 2024-06-06 ENCOUNTER — TELEPHONE (OUTPATIENT)
Dept: CARDIOLOGY CLINIC | Age: 84
End: 2024-06-06

## 2024-06-06 NOTE — TELEPHONE ENCOUNTER
----- Message from Kerrie Walker RN sent at 6/5/2024  6:01 PM EDT -----  Please let her know  her echo looks stable, no changes, thanks

## 2024-06-10 NOTE — TELEPHONE ENCOUNTER
Patient advised. Patient agrees to not start medication at this time. Skilled services/Home bound verification:     Skilled Reason for admission/summary of clinical condition:  Patient was recently hospitalized for Hemothorax, requiring observation by a SN for s/s of decomposition or adverse effects resulting from newly prescribed medications.  Skilled observation needed to determine if new medication regimen prescribed requires modifications or other therapeutic interventions considered until pt's clinical condition or treatment has stabilized. . .  This patient is homebound for the following reasons Requires considerable and taxing effort to leave the home , Requires the assistance of 1 or more persons to leave the home  and Only leaves the home for medical reasons or Christianity services and are infrequent and of short duration for other reasons .    Caregiver: spouse.  Caregiver assists with Caregiver assists patient with bathing, dressing, bathroom, meal prep and setup, medication management, grocery shopping, household chores, transportation to MD appointment and home exercise program..    Medications reconciled and all medications are available in the home this visit.      The following education was provided regarding medications: oxyCODONE HCl (OXY-IR) 10 MG immediate release tablet: Purpose:  pain  management         Precautions/Side Effects/Adverse reactions: constipation, nausea, dizziness, itching  rivaroxaban (XARELTO) 20 MG TABS tablet: Purpose: anticoagulant/blood thinner         Precautions/Side Effects/Adverse reactions:  bleeding, black, geovany stool     Medications  are effective at this time.      High risk medication teaching regarding anticoagulants, antiplatelets, antibiotics, antipsychotics, hypoglycemic agents, or opioid/narcotics performed (specify): (XARELTO) 20 MG TABS tablet, oxyCODONE HCl (OXY-IR) 10 MG immediate release tablet    Mandy Hanson, NP notified of any discrepancies/look a like medications/medication interactions NA.     Home health supplies

## 2024-06-22 PROBLEM — G47.33 OSA (OBSTRUCTIVE SLEEP APNEA): Status: ACTIVE | Noted: 2024-06-22

## 2024-06-23 NOTE — PROGRESS NOTES
[facility]  24  Referring: Dr. Reina    REASON FOR CONSULT/CHIEF COMPLAINT/HPI     Reason for visit/ Chief complaint  Follow up  Chest pain, dizziness   HPI Delilah Puckett is a 84 y.o. seen as a 2 year follow up for dizziness, chest pressure.  She has a history of PAT, hypertension, hyperlipidemia. She is [place] has hearing aids that she does not always wear. She has RA, does not treat it because she does not think she has it \"bad enough\"  She had breast cancer in , treated with mastectomy, and 36 tx of radiation. She had a hysterectomy for noncancerous polyps.    She has never been pregnant, was on fertility tx for years. She occasionally has a glass of sweet white wine. Was a smoker during college, quit  one year later. She has had multiple orthopedic surgeries.  Father had CABG at age 50 (he was a a smoker). Mother  of cancer. Siblings all have RA.    In 2022, she had a CTA to evaluate chest pain, that showed plaque like luminal irregularities throughout the coronary arteries. She was referred to GI for possible esophageal dilation.  She was also treated for vertigo, with resolution of symptoms.     She and her  are busy renovating an 1820 house. They spend 6 months out of the year down in [place].     Per PCP note on 24 - Delilah has had a sleep study which revealed obstructive sleep apnea hypopnea - severe and was advised to make appointment with sleep specialist for further treatment, she was previously referred to neuropsych for memory problems but she never did see them, she did establish with ENT in [place] for dizziness and meniere's disease was ruled out.  Unclear cause of vertigo.  Continues to have balance issues, no longer doing vestibular physical therapy.  Did have PET and likely related to the ears.  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  She was advised to 
sweets  -Recommend 150 minutes of exercise per week       4.Hiatal hernia/esophageal inflammation  -Incidental finding on CTA  Plan  -has been referred to GI for further evaluation      5.   Fatty liver  -Incidental finding on CTA  Plan  -Recommend weight loss  -Decrease sugar in diet      6.  RA  -She has chosen not to medically treat her RA  -Referral to rheum at previous visit, she has had 3 joints replaced. She is not asymptomatic  Plan  -Stable      7.  Balance issues/leg weakness  -Participates in water therapy while in [place]  -ENT r/o menierelvis  -Recently completed Vestibular Therapy   Plan  -Agreeable to looking into local options    8.  Obstructive Sleep Apnea, hypopnea, severe  -Discussed the risks of untreated sleep apnea and recommended for her to follow up with Sleep Medicine in regard to treatment    Plan    9.  Shortness of breath on exertion  -With walking, going up the stairs  -possibilites: hefpef, deconditioning, phtn from ra, radiation of lungs  - 5/6/24  12.2/37.7  - ECHO nrml EF/diastolic fx  - no pft found  Plan:  Obtain PFT    Patient counseled on lifestyle modification, diet, and exercise.    Follow Up:         Dr. Colby Stephenson DO  Cardiologist [facility]

## 2024-06-24 ENCOUNTER — OFFICE VISIT (OUTPATIENT)
Dept: CARDIOLOGY CLINIC | Age: 84
End: 2024-06-24
Payer: MEDICARE

## 2024-06-24 VITALS
WEIGHT: 176.6 LBS | HEART RATE: 72 BPM | HEIGHT: 64 IN | OXYGEN SATURATION: 97 % | BODY MASS INDEX: 30.15 KG/M2 | SYSTOLIC BLOOD PRESSURE: 118 MMHG | DIASTOLIC BLOOD PRESSURE: 76 MMHG

## 2024-06-24 DIAGNOSIS — I25.10 CORONARY ARTERY CALCIFICATION: Primary | ICD-10-CM

## 2024-06-24 DIAGNOSIS — I25.84 CORONARY ARTERY CALCIFICATION: Primary | ICD-10-CM

## 2024-06-24 DIAGNOSIS — G47.33 OSA (OBSTRUCTIVE SLEEP APNEA): ICD-10-CM

## 2024-06-24 DIAGNOSIS — E78.2 MIXED HYPERLIPIDEMIA: ICD-10-CM

## 2024-06-24 DIAGNOSIS — R06.02 SHORTNESS OF BREATH: ICD-10-CM

## 2024-06-24 DIAGNOSIS — I47.19 PAT (PAROXYSMAL ATRIAL TACHYCARDIA) (HCC): ICD-10-CM

## 2024-06-24 DIAGNOSIS — I10 ESSENTIAL HYPERTENSION: ICD-10-CM

## 2024-06-24 PROCEDURE — 99214 OFFICE O/P EST MOD 30 MIN: CPT | Performed by: INTERNAL MEDICINE

## 2024-06-24 PROCEDURE — 1090F PRES/ABSN URINE INCON ASSESS: CPT | Performed by: INTERNAL MEDICINE

## 2024-06-24 PROCEDURE — G8427 DOCREV CUR MEDS BY ELIG CLIN: HCPCS | Performed by: INTERNAL MEDICINE

## 2024-06-24 PROCEDURE — 1123F ACP DISCUSS/DSCN MKR DOCD: CPT | Performed by: INTERNAL MEDICINE

## 2024-06-24 PROCEDURE — G8399 PT W/DXA RESULTS DOCUMENT: HCPCS | Performed by: INTERNAL MEDICINE

## 2024-06-24 PROCEDURE — 1036F TOBACCO NON-USER: CPT | Performed by: INTERNAL MEDICINE

## 2024-06-24 PROCEDURE — 3074F SYST BP LT 130 MM HG: CPT | Performed by: INTERNAL MEDICINE

## 2024-06-24 PROCEDURE — 3078F DIAST BP <80 MM HG: CPT | Performed by: INTERNAL MEDICINE

## 2024-06-24 PROCEDURE — G8417 CALC BMI ABV UP PARAM F/U: HCPCS | Performed by: INTERNAL MEDICINE

## 2024-06-24 RX ORDER — ASPIRIN 81 MG/1
81 TABLET ORAL DAILY
COMMUNITY
Start: 2024-05-06 | End: 2025-05-06

## 2024-07-08 ENCOUNTER — TELEPHONE (OUTPATIENT)
Dept: CARDIOLOGY CLINIC | Age: 84
End: 2024-07-08

## 2024-07-08 NOTE — TELEPHONE ENCOUNTER
Marc from central scheduling called to fax pt's  full PFT test. Fax sent successfully.   Fax [phone]

## 2024-10-29 ENCOUNTER — OFFICE VISIT (OUTPATIENT)
Dept: ENT CLINIC | Age: 84
End: 2024-10-29
Payer: MEDICARE

## 2024-10-29 VITALS
TEMPERATURE: 97.5 F | OXYGEN SATURATION: 96 % | WEIGHT: 184 LBS | BODY MASS INDEX: 31.41 KG/M2 | HEART RATE: 71 BPM | SYSTOLIC BLOOD PRESSURE: 155 MMHG | HEIGHT: 64 IN | DIASTOLIC BLOOD PRESSURE: 85 MMHG

## 2024-10-29 DIAGNOSIS — H60.501 ACUTE NON-INFECTIVE OTITIS EXTERNA OF RIGHT EAR, UNSPECIFIED TYPE: Primary | ICD-10-CM

## 2024-10-29 DIAGNOSIS — H69.91 DYSFUNCTION OF RIGHT EUSTACHIAN TUBE: ICD-10-CM

## 2024-10-29 PROCEDURE — 3077F SYST BP >= 140 MM HG: CPT | Performed by: STUDENT IN AN ORGANIZED HEALTH CARE EDUCATION/TRAINING PROGRAM

## 2024-10-29 PROCEDURE — G8484 FLU IMMUNIZE NO ADMIN: HCPCS | Performed by: STUDENT IN AN ORGANIZED HEALTH CARE EDUCATION/TRAINING PROGRAM

## 2024-10-29 PROCEDURE — G8417 CALC BMI ABV UP PARAM F/U: HCPCS | Performed by: STUDENT IN AN ORGANIZED HEALTH CARE EDUCATION/TRAINING PROGRAM

## 2024-10-29 PROCEDURE — 1159F MED LIST DOCD IN RCRD: CPT | Performed by: STUDENT IN AN ORGANIZED HEALTH CARE EDUCATION/TRAINING PROGRAM

## 2024-10-29 PROCEDURE — G8427 DOCREV CUR MEDS BY ELIG CLIN: HCPCS | Performed by: STUDENT IN AN ORGANIZED HEALTH CARE EDUCATION/TRAINING PROGRAM

## 2024-10-29 PROCEDURE — 4130F TOPICAL PREP RX AOE: CPT | Performed by: STUDENT IN AN ORGANIZED HEALTH CARE EDUCATION/TRAINING PROGRAM

## 2024-10-29 PROCEDURE — 1126F AMNT PAIN NOTED NONE PRSNT: CPT | Performed by: STUDENT IN AN ORGANIZED HEALTH CARE EDUCATION/TRAINING PROGRAM

## 2024-10-29 PROCEDURE — 99214 OFFICE O/P EST MOD 30 MIN: CPT | Performed by: STUDENT IN AN ORGANIZED HEALTH CARE EDUCATION/TRAINING PROGRAM

## 2024-10-29 PROCEDURE — 1090F PRES/ABSN URINE INCON ASSESS: CPT | Performed by: STUDENT IN AN ORGANIZED HEALTH CARE EDUCATION/TRAINING PROGRAM

## 2024-10-29 PROCEDURE — 1123F ACP DISCUSS/DSCN MKR DOCD: CPT | Performed by: STUDENT IN AN ORGANIZED HEALTH CARE EDUCATION/TRAINING PROGRAM

## 2024-10-29 PROCEDURE — 1036F TOBACCO NON-USER: CPT | Performed by: STUDENT IN AN ORGANIZED HEALTH CARE EDUCATION/TRAINING PROGRAM

## 2024-10-29 PROCEDURE — G8399 PT W/DXA RESULTS DOCUMENT: HCPCS | Performed by: STUDENT IN AN ORGANIZED HEALTH CARE EDUCATION/TRAINING PROGRAM

## 2024-10-29 PROCEDURE — 3079F DIAST BP 80-89 MM HG: CPT | Performed by: STUDENT IN AN ORGANIZED HEALTH CARE EDUCATION/TRAINING PROGRAM

## 2024-10-29 RX ORDER — CIPROFLOXACIN AND DEXAMETHASONE 3; 1 MG/ML; MG/ML
4 SUSPENSION/ DROPS AURICULAR (OTIC) 2 TIMES DAILY
Qty: 1 EACH | Refills: 0 | Status: SHIPPED | OUTPATIENT
Start: 2024-10-29 | End: 2024-11-08

## 2024-10-29 NOTE — PROGRESS NOTES
[facility]  DIVISION OF OTOLARYNGOLOGY- HEAD & NECK SURGERY  CLINIC FOLLOW-UP VISIT      Patient Name: Delilah Puckett  Medical Record Number:  [id number]  Primary Care Physician:  Soumya Reina MD    ChiefComplaint     Chief Complaint   Patient presents with    Ear Problem     Bleeding in Rt ear        History of Present Illness     Delilah Puckett is an 84 y.o. female previously seen for right otalgia, right eustachian tube dysfunction. Right PE tube placed under local anesthesia on 7/11/2022 and replaced on 9/19/2023.     Interval History:   She noted bleeding from her right ear 2 days ago.  She woke up in the morning had blood coming from her right ear going down her neck.  No recent hearing changes.  No otalgia.  Went to urgent care and was given ofloxacin drops.      Past Medical History     Past Medical History:   Diagnosis Date    Anaphylactic reaction 10/22/2015    Went to ER    Carcinoma in situ of breast     Esophageal reflux     hysterectomy     Lymphedema of left upper extremity 02/2016    Osteoarthrosis, unspecified whether generalized or localized, unspecified site     Other and unspecified hyperlipidemia     Other extrapyramidal disease and abnormal movement disorder     Unspecified essential hypertension     Varicose veins of lower extremities with other complications        Past Surgical History     Past Surgical History:   Procedure Laterality Date    COLONOSCOPY  1/8/02    colonoscopy screening (1/8/2002): mattie Mcginnis 10yr     HYSTERECTOMY (CERVIX STATUS UNKNOWN)      KNEE ARTHROSCOPY Right     rt knee, menisectomy     LUNG BIOPSY  12/21/2015    Cancer    MAMMO IMPLANT DIGITAL DIAG BI  11/2011    mammogram screening 11/2011    MASTECTOMY Bilateral 2016       Family History     Family History   Problem Relation Age of Onset    Heart Disease Mother     Hypertension Mother     Heart Disease Father     Hypertension Father     Cancer Father     Diabetes Maternal Uncle        Social History

## 2024-11-14 ENCOUNTER — TELEPHONE (OUTPATIENT)
Dept: CARDIOLOGY CLINIC | Age: 84
End: 2024-11-14

## 2025-05-28 ENCOUNTER — HOSPITAL ENCOUNTER (OUTPATIENT)
Dept: PULMONOLOGY | Age: 85
Discharge: HOME | End: 2025-05-28
Attending: INTERNAL MEDICINE
Payer: MEDICARE

## 2025-05-28 DIAGNOSIS — R06.02 SHORTNESS OF BREATH: ICD-10-CM

## 2025-05-28 LAB
DLCO %PRED: 66 %
DLCO PRED: NORMAL
DLCO/VA %PRED: NORMAL
DLCO/VA PRED: NORMAL
DLCO/VA: NORMAL
DLCO: NORMAL
EXPIRATORY TIME-POST: NORMAL
EXPIRATORY TIME: NORMAL
FEF 25-75 %CHNG: NORMAL
FEF 25-75 POST %PRED: NORMAL
FEF 25-75% %PRED-PRE: NORMAL
FEF 25-75% PRED: NORMAL
FEF 25-75-POST: NORMAL
FEF 25-75-PRE: NORMAL
FEV1 %PRED-POST: NORMAL
FEV1 %PRED-PRE: 105 %
FEV1 PRED: NORMAL
FEV1-POST: NORMAL
FEV1-PRE: NORMAL
FEV1/FVC %PRED-POST: NORMAL
FEV1/FVC %PRED-PRE: NORMAL
FEV1/FVC PRED: NORMAL
FEV1/FVC-POST: NORMAL
FEV1/FVC-PRE: 102 %
FVC %PRED-POST: NORMAL
FVC %PRED-PRE: NORMAL
FVC PRED: NORMAL
FVC-POST: NORMAL
FVC-PRE: NORMAL
GAW %PRED: NORMAL
GAW PRED: NORMAL
GAW: NORMAL
IC PRE %PRED: NORMAL
IC PRED: NORMAL
IC: NORMAL
MEP: NORMAL
MIP: NORMAL
MVV %PRED-PRE: NORMAL
MVV PRED: NORMAL
MVV-PRE: NORMAL
PEF %PRED-POST: NORMAL
PEF %PRED-PRE: NORMAL
PEF PRED: NORMAL
PEF%CHNG: NORMAL
PEF-POST: NORMAL
PEF-PRE: NORMAL
RAW %PRED: NORMAL
RAW PRED: NORMAL
RAW: NORMAL
RV PRE %PRED: NORMAL
RV PRED: NORMAL
RV: NORMAL
SVC %PRED: NORMAL
SVC PRED: NORMAL
SVC: NORMAL
TLC PRE %PRED: 92 %
TLC PRED: NORMAL
TLC: NORMAL
VA %PRED: NORMAL
VA PRED: NORMAL
VA: NORMAL
VTG %PRED: NORMAL
VTG PRED: NORMAL
VTG: NORMAL

## 2025-05-28 PROCEDURE — 94726 PLETHYSMOGRAPHY LUNG VOLUMES: CPT

## 2025-05-28 PROCEDURE — 94010 BREATHING CAPACITY TEST: CPT

## 2025-05-28 PROCEDURE — 94729 DIFFUSING CAPACITY: CPT

## 2025-05-28 PROCEDURE — 94760 N-INVAS EAR/PLS OXIMETRY 1: CPT

## 2025-05-28 ASSESSMENT — PULMONARY FUNCTION TESTS
FEV1/FVC_PRE: 102
FEV1_PERCENT_PREDICTED_PRE: 105

## 2025-05-30 NOTE — PROCEDURES
Pulmonary Function Testing      Patient name:  Delilah Puckett      Unit #:   [id number]   Date of test:  5/28/2025   Date of interpretation:   5/30/2025    Ms. Delilah Puckett is a 85 y.o. year-old female. The spirometry data were acceptable and reproducible.     Spirometry:  Flow volume loops were normal. The FEV-1/FVC ratio was normal. The pre-bronchodilator FEV-1 was 1.70 liters (0.25 Z score), which was normal. The FVC was 2.26 liters (0.17 Z score), which was normal. Response to inhaled bronchodilators (albuterol) was not performed.    Lung volumes:  Lung volumes were tested by plethysmography. The total lung capacity was 4.39 liters (-0.68 Z score), which was normal . The residual volume was 2.26 liters (-0.29 Z score), which was normal. The ratio of residual volume to total lung capacity (RV/TLC) was 0.39 Z score, which was normal.     Diffusion capacity was found to be -1.64 Z score which is normal.      Interpretation:  Normal pulmonary function testing    Comments:  Z score  Mild -1.645 to -2.5  Moderate -2.5 to -4.0  Severe: < -4.0     Using Z-scores can reduce age and height bias, and the recommended thresholds correlate with mortality risk.    Shoaib Urbina MD

## 2025-07-25 NOTE — PROGRESS NOTES
normal, no drain age, Non-tender Pulse 2+ and symmetric   Throat Lips, mucosa, tongue normal Skin Right arm cellulitis    Neck S/S, TM, NT, no bruit Psych Nl mood and affect   Lung CTA-B, unlabored, no DTP     Ch wall NT, no deform       LABS and Imaging     Relevant and available CV data reviewed    EKG personally interpreted:  9/9/22 NSR    SPECT 5/2014  IMPRESSION :   Normal myocardial perfusion scan     Echo/MRI:     ECHO 5/31/24  Left Ventricle: Normal left ventricular systolic function with a visually estimated EF of 60 - 65%. Left ventricle size is normal. Normal wall thickness. Normal wall motion. Normal diastolic function. Average E/e' ratio is 14.70.    Aortic Valve: Trileaflet valve. No regurgitation. No stenosis.    Mitral Valve: Trace regurgitation.    Tricuspid Valve: Trace regurgitation. The estimated RVSP is 25 mmHg.    Image quality is good.    Stress Echo 7/25/19  Summary   Normal left ventricle size, wall thickness and systolic function with an   estimated ejection fraction of 60%. No regional wall motion abnormalities   are seen.   E/e\"= 13. Grade I diastolic dysfunction with normal LV filling pressures.   Trivial mitral regurgitation.   Mild tricuspid regurgitation. PASP 20mmHg.   IVC size is normal (<2.1cm) and collapses > 50% with respiration consistent   with normal RA pressure (3mmHg).  Summary   Normal stress echocardiogram study.   There was stress induced shortness of breath. Peak O2 93%. No chest pain.    Coronary CTA 9/28/22  IMPRESSION:  Scattered plaque-like luminal irregularities throughout the coronary  arteries.  No flow limiting stenosis noted  Calcium score 120.  This is in the 40th percentile  Small pulmonary nodule right middle lobe.  This is unchanged compared to  2017.  No specific imaging follow-up    Carotid US 11/2020  No hemodynamically significant carotid stenosis noted on either side.   Vertebral flow are antegrade bilaterally.       Moderate Risk  Moderate

## 2025-07-30 RX ORDER — ROSUVASTATIN CALCIUM 10 MG/1
10 TABLET, COATED ORAL DAILY
Qty: 90 TABLET | Refills: 3 | Status: SHIPPED | OUTPATIENT
Start: 2025-07-30

## 2025-07-30 NOTE — TELEPHONE ENCOUNTER
Requested Prescriptions     Pending Prescriptions Disp Refills    rosuvastatin (CRESTOR) 10 MG tablet [Pharmacy Med Name: ROSUVASTATIN CALCIUM 10 MG TAB] 90 tablet 3     Sig: TAKE 1 TABLET BY MOUTH DAILY      LAST OV: 6/24/2024   NEXT OV: 08/01/2025

## 2025-08-01 ENCOUNTER — OFFICE VISIT (OUTPATIENT)
Dept: CARDIOLOGY CLINIC | Age: 85
End: 2025-08-01
Payer: MEDICARE

## 2025-08-01 VITALS
WEIGHT: 175 LBS | SYSTOLIC BLOOD PRESSURE: 138 MMHG | DIASTOLIC BLOOD PRESSURE: 68 MMHG | BODY MASS INDEX: 29.88 KG/M2 | HEART RATE: 69 BPM | HEIGHT: 64 IN | OXYGEN SATURATION: 99 %

## 2025-08-01 DIAGNOSIS — I47.19 PAT (PAROXYSMAL ATRIAL TACHYCARDIA): ICD-10-CM

## 2025-08-01 DIAGNOSIS — I25.10 CORONARY ARTERY CALCIFICATION: Primary | ICD-10-CM

## 2025-08-01 DIAGNOSIS — I10 ESSENTIAL HYPERTENSION: ICD-10-CM

## 2025-08-01 DIAGNOSIS — R06.02 SHORTNESS OF BREATH: ICD-10-CM

## 2025-08-01 DIAGNOSIS — G47.33 OSA (OBSTRUCTIVE SLEEP APNEA): ICD-10-CM

## 2025-08-01 DIAGNOSIS — E78.2 MIXED HYPERLIPIDEMIA: ICD-10-CM

## 2025-08-01 PROCEDURE — 1159F MED LIST DOCD IN RCRD: CPT | Performed by: INTERNAL MEDICINE

## 2025-08-01 PROCEDURE — 1123F ACP DISCUSS/DSCN MKR DOCD: CPT | Performed by: INTERNAL MEDICINE

## 2025-08-01 PROCEDURE — 99214 OFFICE O/P EST MOD 30 MIN: CPT | Performed by: INTERNAL MEDICINE

## 2025-08-01 PROCEDURE — 1036F TOBACCO NON-USER: CPT | Performed by: INTERNAL MEDICINE

## 2025-08-01 PROCEDURE — G8427 DOCREV CUR MEDS BY ELIG CLIN: HCPCS | Performed by: INTERNAL MEDICINE

## 2025-08-01 PROCEDURE — 3078F DIAST BP <80 MM HG: CPT | Performed by: INTERNAL MEDICINE

## 2025-08-01 PROCEDURE — G8399 PT W/DXA RESULTS DOCUMENT: HCPCS | Performed by: INTERNAL MEDICINE

## 2025-08-01 PROCEDURE — G8417 CALC BMI ABV UP PARAM F/U: HCPCS | Performed by: INTERNAL MEDICINE

## 2025-08-01 PROCEDURE — 93000 ELECTROCARDIOGRAM COMPLETE: CPT | Performed by: INTERNAL MEDICINE

## 2025-08-01 PROCEDURE — 3075F SYST BP GE 130 - 139MM HG: CPT | Performed by: INTERNAL MEDICINE

## 2025-08-01 PROCEDURE — 1090F PRES/ABSN URINE INCON ASSESS: CPT | Performed by: INTERNAL MEDICINE

## 2025-08-01 RX ORDER — CLINDAMYCIN HYDROCHLORIDE 300 MG/1
300 CAPSULE ORAL 3 TIMES DAILY
COMMUNITY
Start: 2025-07-30 | End: 2025-08-09

## 2025-08-04 ENCOUNTER — RESULTS FOLLOW-UP (OUTPATIENT)
Dept: CARDIOLOGY CLINIC | Age: 85
End: 2025-08-04